# Patient Record
Sex: MALE | Race: OTHER | HISPANIC OR LATINO
[De-identification: names, ages, dates, MRNs, and addresses within clinical notes are randomized per-mention and may not be internally consistent; named-entity substitution may affect disease eponyms.]

---

## 2018-10-15 ENCOUNTER — APPOINTMENT (OUTPATIENT)
Dept: MRI IMAGING | Facility: HOSPITAL | Age: 33
End: 2018-10-15

## 2018-10-18 ENCOUNTER — APPOINTMENT (OUTPATIENT)
Dept: MRI IMAGING | Facility: HOSPITAL | Age: 33
End: 2018-10-18

## 2018-10-18 ENCOUNTER — OUTPATIENT (OUTPATIENT)
Dept: OUTPATIENT SERVICES | Facility: HOSPITAL | Age: 33
LOS: 1 days | End: 2018-10-18
Payer: SUBSIDIZED

## 2018-10-18 DIAGNOSIS — Z00.6 ENCOUNTER FOR EXAMINATION FOR NORMAL COMPARISON AND CONTROL IN CLINICAL RESEARCH PROGRAM: ICD-10-CM

## 2018-10-18 PROCEDURE — 70551 MRI BRAIN STEM W/O DYE: CPT | Mod: 26

## 2018-10-18 PROCEDURE — 70551 MRI BRAIN STEM W/O DYE: CPT

## 2019-03-31 ENCOUNTER — EMERGENCY (EMERGENCY)
Facility: HOSPITAL | Age: 34
LOS: 1 days | Discharge: HOME | End: 2019-03-31
Attending: EMERGENCY MEDICINE | Admitting: EMERGENCY MEDICINE
Payer: COMMERCIAL

## 2019-03-31 VITALS
OXYGEN SATURATION: 99 % | SYSTOLIC BLOOD PRESSURE: 157 MMHG | HEART RATE: 112 BPM | TEMPERATURE: 98 F | RESPIRATION RATE: 16 BRPM | DIASTOLIC BLOOD PRESSURE: 97 MMHG

## 2019-03-31 LAB
ALBUMIN SERPL ELPH-MCNC: 4.2 G/DL — SIGNIFICANT CHANGE UP (ref 3.3–5)
ALP SERPL-CCNC: 76 U/L — SIGNIFICANT CHANGE UP (ref 40–120)
ALT FLD-CCNC: 46 U/L — HIGH (ref 4–41)
ANION GAP SERPL CALC-SCNC: 12 MMO/L — SIGNIFICANT CHANGE UP (ref 7–14)
AST SERPL-CCNC: 31 U/L — SIGNIFICANT CHANGE UP (ref 4–40)
BASOPHILS # BLD AUTO: 0.06 K/UL — SIGNIFICANT CHANGE UP (ref 0–0.2)
BASOPHILS NFR BLD AUTO: 0.5 % — SIGNIFICANT CHANGE UP (ref 0–2)
BILIRUB SERPL-MCNC: < 0.2 MG/DL — LOW (ref 0.2–1.2)
BUN SERPL-MCNC: 13 MG/DL — SIGNIFICANT CHANGE UP (ref 7–23)
CALCIUM SERPL-MCNC: 9.4 MG/DL — SIGNIFICANT CHANGE UP (ref 8.4–10.5)
CHLORIDE SERPL-SCNC: 100 MMOL/L — SIGNIFICANT CHANGE UP (ref 98–107)
CO2 SERPL-SCNC: 26 MMOL/L — SIGNIFICANT CHANGE UP (ref 22–31)
CREAT SERPL-MCNC: 0.57 MG/DL — SIGNIFICANT CHANGE UP (ref 0.5–1.3)
EOSINOPHIL # BLD AUTO: 0.42 K/UL — SIGNIFICANT CHANGE UP (ref 0–0.5)
EOSINOPHIL NFR BLD AUTO: 3.8 % — SIGNIFICANT CHANGE UP (ref 0–6)
GLUCOSE SERPL-MCNC: 98 MG/DL — SIGNIFICANT CHANGE UP (ref 70–99)
GRAM STN SPEC: SIGNIFICANT CHANGE UP
HBA1C BLD-MCNC: 5.3 % — SIGNIFICANT CHANGE UP (ref 4–5.6)
HCT VFR BLD CALC: 49 % — SIGNIFICANT CHANGE UP (ref 39–50)
HGB BLD-MCNC: 16.7 G/DL — SIGNIFICANT CHANGE UP (ref 13–17)
IMM GRANULOCYTES NFR BLD AUTO: 0.4 % — SIGNIFICANT CHANGE UP (ref 0–1.5)
LYMPHOCYTES # BLD AUTO: 2.41 K/UL — SIGNIFICANT CHANGE UP (ref 1–3.3)
LYMPHOCYTES # BLD AUTO: 21.7 % — SIGNIFICANT CHANGE UP (ref 13–44)
MCHC RBC-ENTMCNC: 31.5 PG — SIGNIFICANT CHANGE UP (ref 27–34)
MCHC RBC-ENTMCNC: 34.1 % — SIGNIFICANT CHANGE UP (ref 32–36)
MCV RBC AUTO: 92.3 FL — SIGNIFICANT CHANGE UP (ref 80–100)
MONOCYTES # BLD AUTO: 0.71 K/UL — SIGNIFICANT CHANGE UP (ref 0–0.9)
MONOCYTES NFR BLD AUTO: 6.4 % — SIGNIFICANT CHANGE UP (ref 2–14)
NEUTROPHILS # BLD AUTO: 7.49 K/UL — HIGH (ref 1.8–7.4)
NEUTROPHILS NFR BLD AUTO: 67.2 % — SIGNIFICANT CHANGE UP (ref 43–77)
NRBC # FLD: 0 K/UL — SIGNIFICANT CHANGE UP (ref 0–0)
PLATELET # BLD AUTO: 334 K/UL — SIGNIFICANT CHANGE UP (ref 150–400)
PMV BLD: 9.1 FL — SIGNIFICANT CHANGE UP (ref 7–13)
POTASSIUM SERPL-MCNC: 4.3 MMOL/L — SIGNIFICANT CHANGE UP (ref 3.5–5.3)
POTASSIUM SERPL-SCNC: 4.3 MMOL/L — SIGNIFICANT CHANGE UP (ref 3.5–5.3)
PROT SERPL-MCNC: 7.7 G/DL — SIGNIFICANT CHANGE UP (ref 6–8.3)
RBC # BLD: 5.31 M/UL — SIGNIFICANT CHANGE UP (ref 4.2–5.8)
RBC # FLD: 12.9 % — SIGNIFICANT CHANGE UP (ref 10.3–14.5)
SODIUM SERPL-SCNC: 138 MMOL/L — SIGNIFICANT CHANGE UP (ref 135–145)
SPECIMEN SOURCE: SIGNIFICANT CHANGE UP
WBC # BLD: 11.13 K/UL — HIGH (ref 3.8–10.5)
WBC # FLD AUTO: 11.13 K/UL — HIGH (ref 3.8–10.5)

## 2019-03-31 PROCEDURE — 99218: CPT

## 2019-03-31 PROCEDURE — 73620 X-RAY EXAM OF FOOT: CPT | Mod: 26,LT

## 2019-03-31 RX ORDER — SODIUM CHLORIDE 9 MG/ML
1000 INJECTION INTRAMUSCULAR; INTRAVENOUS; SUBCUTANEOUS ONCE
Qty: 0 | Refills: 0 | Status: COMPLETED | OUTPATIENT
Start: 2019-03-31 | End: 2019-03-31

## 2019-03-31 RX ADMIN — SODIUM CHLORIDE 1000 MILLILITER(S): 9 INJECTION INTRAMUSCULAR; INTRAVENOUS; SUBCUTANEOUS at 19:09

## 2019-03-31 RX ADMIN — Medication 100 MILLIGRAM(S): at 16:38

## 2019-03-31 RX ADMIN — SODIUM CHLORIDE 1000 MILLILITER(S): 9 INJECTION INTRAMUSCULAR; INTRAVENOUS; SUBCUTANEOUS at 20:15

## 2019-03-31 NOTE — ED PROVIDER NOTE - CLINICAL SUMMARY MEDICAL DECISION MAKING FREE TEXT BOX
Patient with left foot cellulitis/abscess, well perfused, no systemic symptoms, worsening on 2 d of augmentin, will check labs, podiatry, abx, reass. Patient with left foot cellulitis/abscess, well perfused, no systemic symptoms, redness, fluctuance worsening on 2 d of augmentin, will check labs, podiatry, abx, reass.

## 2019-03-31 NOTE — ED PROVIDER NOTE - PHYSICAL EXAMINATION
GEN - NAD; well appearing; A+O x3   HEAD - NC/AT   EYES- PERRL, EOMI  ENT: Airway patent, mmm, Oral cavity and pharynx normal. No inflammation, swelling, exudate, or lesions.    NECK: Neck supple, non-tender without lymphadenopathy, no masses.  PULMONARY - CTA b/l, symmetric breath sounds.   CARDIAC -s1s2, RRR, no M,G,R  ABDOMEN - +BS, ND, NT, soft, no guarding, no rebound, no masses   BACK - no CVA tenderness, Normal  spine   EXTREMITIES - FROM, no edema   SKIN - ball of left foot with erythema approx 4cm in diameter, central fluctuance, trace drainage of serosanguinous fluid, no crepitus, 2+ dp pulse, cap refill <2s, remainder of skin without abnormality  NEUROLOGIC - alert, speech clear, no focal deficits  PSYCH -nl mood/affect, nl insight. GEN - NAD; well appearing; A+O x3   HEAD - NC/AT   EYES- PERRL, EOMI  ENT: Airway patent, mmm, Oral cavity and pharynx normal. No inflammation, swelling, exudate, or lesions.    NECK: Neck supple, non-tender without lymphadenopathy, no masses.  PULMONARY - CTA b/l, symmetric breath sounds.   CARDIAC -s1s2, RRR, no M,G,R  ABDOMEN - +BS, ND, NT, soft, no guarding, no rebound, no masses   BACK - no CVA tenderness, Normal  spine   EXTREMITIES - FROM, no edema   SKIN - ball of left foot with erythema approx 4cm in diameter, central fluctuance, trace drainage of serosanguinous fluid, no crepitus, 2+ dp pulse, cap refill <2s, nvi, remainder of skin without abnormality  NEUROLOGIC - alert, speech clear, no focal deficits  PSYCH -nl mood/affect, nl insight.

## 2019-03-31 NOTE — ED PROVIDER NOTE - NS ED ROS FT
ROS:  GENERAL: No fever, no chills  EYES: no change in vision  HEENT: no trouble swallowing, no trouble speaking  CARDIAC: no chest pain  PULMONARY: no cough, no shortness of breath  GI: no abdominal pain, no nausea, no vomiting, no diarrhea, no constipation  : No dysuria, no frequency, no change in appearance, or odor of urine  SKIN: no rashes  NEURO: no headache, no weakness  MSK: +redness, swelling, drainage of fluid from center of ball of foot

## 2019-03-31 NOTE — ED PROVIDER NOTE - OBJECTIVE STATEMENT
34 y/o m presenting with draining wound to left foot. Started 4 days ago, pain and redness to ball of left foot. Saw a podiatrist who did an xray and started him on augmentin. Pain continues, constant, throbbing, now associated with a slow drainage of fluid. No fevers, chills, ha, cp, sob, abd pain, vomiting, diarrhea, dysuria. No weakness or numbness in foot.

## 2019-03-31 NOTE — ED CDU PROVIDER INITIAL DAY NOTE - ATTENDING CONTRIBUTION TO CARE
I performed a face-to-face evaluation of the patient and performed a history and physical examination. I agree with the history and physical examination.    Jigar: Admit to CDU for L foot plantar abscess w/ cellulitis for IV Abx, elevation, and re-assessment.

## 2019-03-31 NOTE — ED CDU PROVIDER INITIAL DAY NOTE - OBJECTIVE STATEMENT
Jigar: 33 M, no relevant PMH, 2 wks ago jumped and landed hard on feet, but didn't have pain after that. 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 Saw a Podiatrist, who prescribed Augmentin. No improvement. Came to [facility] ED. Podiatry I & D, IV Abx. Admit to CDU for continued IV Abx, elevation, and re-assessment.

## 2019-03-31 NOTE — ED PROCEDURE NOTE - PROCEDURE ADDITIONAL DETAILS
US to evaluate what was a likely abscess. Pt with foot pain, fluctuant area with overlying erythema on the plantar surface of the left foot which had opened and begun to spontaneously drain seropurulent fluid. Used linear probe to identify the area which appeared complex and localized as seen on images. Color was placed on the lesion and it was not found to have any flow; surrounding tissue was fairly active. No other findings.

## 2019-03-31 NOTE — PROGRESS NOTE ADULT - ASSESSMENT
34 yo M with left plantar foot abscess.   - Pt seen and evaluated  - Pt afebrile, Tachy to 112, WBC 11.3  - PT block preformed utilizing 12 cc of 1% lidocaine plain. Bedside I&D preformed using 15 blade, 5 cc of puruluence expressed. Wound explored until no further purulence noted. Flushed wound with 50 cc sterile saline. wound was packed and dressed with DSD.   - XR neg   - Rec CDU for IV clinda  - Can DC in the AM if cellulitis/ pain improved  - d/w attending 32 yo M with left plantar foot abscess.   - Pt seen and evaluated  - Pt afebrile, Tachy to 112, WBC 11.3  - PT block preformed utilizing 12 cc of 1% lidocaine plain. Bedside I&D preformed using 15 blade, 5 cc of puruluence expressed. Wound explored until no further purulence noted. Flushed wound with 50 cc sterile saline. wound was packed and dressed with DSD.   - XR neg   - wound culture taken   - Rec CDU for IV clinda  - Can DC in the AM if cellulitis/ pain improved  - d/w attending

## 2019-03-31 NOTE — PROGRESS NOTE ADULT - SUBJECTIVE AND OBJECTIVE BOX
Podiatry pager #: [phone]    Patient is a 33y old  Male who presents with a chief complaint of left foot pain    HPI:  32 y/o m presenting with draining wound to left foot. Started 4 days ago, pain and redness to ball of left foot. Saw a podiatrist who did an xray and started him on augmentin. Pain continues, constant, throbbing, now associated with a slow drainage of fluid. No fevers, chills, ha, cp, sob, abd pain, vomiting, diarrhea, dysuria. No weakness or numbness in foot.    PAST MEDICAL & SURGICAL HISTORY:  Eczema  Seizure: X1 2006  Bipolar disorder  Joint symptoms  No significant past surgical history      MEDICATIONS  (STANDING):    MEDICATIONS  (PRN):      Allergies    all seafood-  swelling, of eyes, throat, mouth (Other)  No Known Drug Allergies    Intolerances        VITALS:    Vital Signs Last 24 Hrs  T(C): 36.8 (31 Mar 2019 12:34), Max: 36.8 (31 Mar 2019 12:34)  T(F): 98.3 (31 Mar 2019 12:34), Max: 98.3 (31 Mar 2019 12:34)  HR: 112 (31 Mar 2019 12:34) (112 - 112)  BP: 157/97 (31 Mar 2019 12:34) (157/97 - 157/97)  BP(mean): --  RR: 16 (31 Mar 2019 12:34) (16 - 16)  SpO2: 99% (31 Mar 2019 12:34) (99% - 99%)    LABS:                          16.7   11.13 )-----------( 334      ( 31 Mar 2019 14:20 )             49.0       03-31    138  |  100  |  13  ----------------------------<  98  4.3   |  26  |  0.57    Ca    9.4      31 Mar 2019 14:20    TPro  7.7  /  Alb  4.2  /  TBili  < 0.2<L>  /  DBili  x   /  AST  31  /  ALT  46<H>  /  AlkPhos  76  03-31      CAPILLARY BLOOD GLUCOSE              LOWER EXTREMITY PHYSICAL EXAM:    Vasular: DP/PT 2 /4, B/L, CFT < 3seconds B/L, Temperature gradient  warm to cool on right foot and warm to warm on left foot.    Neuro: Epicritic sensation intact to the level of toes B/L.  Musculoskeletal/Ortho: no gross deformities   Skin: diffuse annular scaling b/l, LF plantar 3 cm X 3 cm flutuent area with erythema extending 2 cm proximally.       RADIOLOGY & ADDITIONAL STUDIES:    < from: Xray Foot AP + Lateral, Left (03.31.19 @ 15:27) >    ******PRELIMINARY REPORT******    ******PRELIMINARY REPORT******            EXAM:  RAD FOOT 2 VIEWS LEFT        PROCEDURE DATE:  Mar 31 2019     ******PRELIMINARY REPORT******    ******PRELIMINARY REPORT******            INTERPRETATION:  No obvioussoft tissue ulceration or radiographic   evidence for osteomyelitis. No subcutaneous emphysema.            ******PRELIMINARY REPORT******    ******PRELIMINARY REPORT******          GEOFFREY VELOZ M.D., RADIOLOGY RESIDENT              < end of copied text >

## 2019-03-31 NOTE — ED CDU PROVIDER INITIAL DAY NOTE - MEDICAL DECISION MAKING DETAILS
Jigar: Admit to CDU for L foot plantar abscess w/ cellulitis for IV Abx, elevation, and re-assessment.

## 2019-03-31 NOTE — ED CDU PROVIDER INITIAL DAY NOTE - SKIN, MLM
L plantar surface erythema ~4cm diameter, central fluctuance, trace drainage of serosanguinous fluid, no crepitus, 2+ dp pulse, cap refill <2s

## 2019-03-31 NOTE — ED ADULT TRIAGE NOTE - CHIEF COMPLAINT QUOTE
Pt c/o left foot pain.  Pt went to podiatrist for pain and sore on bottom of foot, was told to come to ED if pain continued.  Drainage noted from sore.

## 2019-04-01 LAB
ALBUMIN SERPL ELPH-MCNC: 3.9 G/DL — SIGNIFICANT CHANGE UP (ref 3.3–5)
ALP SERPL-CCNC: 70 U/L — SIGNIFICANT CHANGE UP (ref 40–120)
ALT FLD-CCNC: 44 U/L — HIGH (ref 4–41)
ANION GAP SERPL CALC-SCNC: 12 MMO/L — SIGNIFICANT CHANGE UP (ref 7–14)
AST SERPL-CCNC: 19 U/L — SIGNIFICANT CHANGE UP (ref 4–40)
BASOPHILS # BLD AUTO: 0.03 K/UL — SIGNIFICANT CHANGE UP (ref 0–0.2)
BASOPHILS NFR BLD AUTO: 0.3 % — SIGNIFICANT CHANGE UP (ref 0–2)
BILIRUB SERPL-MCNC: 0.4 MG/DL — SIGNIFICANT CHANGE UP (ref 0.2–1.2)
BUN SERPL-MCNC: 12 MG/DL — SIGNIFICANT CHANGE UP (ref 7–23)
CALCIUM SERPL-MCNC: 9.2 MG/DL — SIGNIFICANT CHANGE UP (ref 8.4–10.5)
CHLORIDE SERPL-SCNC: 102 MMOL/L — SIGNIFICANT CHANGE UP (ref 98–107)
CO2 SERPL-SCNC: 25 MMOL/L — SIGNIFICANT CHANGE UP (ref 22–31)
CREAT SERPL-MCNC: 0.66 MG/DL — SIGNIFICANT CHANGE UP (ref 0.5–1.3)
EOSINOPHIL # BLD AUTO: 0.43 K/UL — SIGNIFICANT CHANGE UP (ref 0–0.5)
EOSINOPHIL NFR BLD AUTO: 4.9 % — SIGNIFICANT CHANGE UP (ref 0–6)
GLUCOSE SERPL-MCNC: 90 MG/DL — SIGNIFICANT CHANGE UP (ref 70–99)
HCT VFR BLD CALC: 45.4 % — SIGNIFICANT CHANGE UP (ref 39–50)
HGB BLD-MCNC: 15.3 G/DL — SIGNIFICANT CHANGE UP (ref 13–17)
IMM GRANULOCYTES NFR BLD AUTO: 0.3 % — SIGNIFICANT CHANGE UP (ref 0–1.5)
LYMPHOCYTES # BLD AUTO: 2.4 K/UL — SIGNIFICANT CHANGE UP (ref 1–3.3)
LYMPHOCYTES # BLD AUTO: 27.6 % — SIGNIFICANT CHANGE UP (ref 13–44)
MCHC RBC-ENTMCNC: 31.4 PG — SIGNIFICANT CHANGE UP (ref 27–34)
MCHC RBC-ENTMCNC: 33.7 % — SIGNIFICANT CHANGE UP (ref 32–36)
MCV RBC AUTO: 93 FL — SIGNIFICANT CHANGE UP (ref 80–100)
MONOCYTES # BLD AUTO: 0.64 K/UL — SIGNIFICANT CHANGE UP (ref 0–0.9)
MONOCYTES NFR BLD AUTO: 7.3 % — SIGNIFICANT CHANGE UP (ref 2–14)
NEUTROPHILS # BLD AUTO: 5.18 K/UL — SIGNIFICANT CHANGE UP (ref 1.8–7.4)
NEUTROPHILS NFR BLD AUTO: 59.6 % — SIGNIFICANT CHANGE UP (ref 43–77)
NRBC # FLD: 0 K/UL — SIGNIFICANT CHANGE UP (ref 0–0)
PLATELET # BLD AUTO: 301 K/UL — SIGNIFICANT CHANGE UP (ref 150–400)
PMV BLD: 9.1 FL — SIGNIFICANT CHANGE UP (ref 7–13)
POTASSIUM SERPL-MCNC: 3.9 MMOL/L — SIGNIFICANT CHANGE UP (ref 3.5–5.3)
POTASSIUM SERPL-SCNC: 3.9 MMOL/L — SIGNIFICANT CHANGE UP (ref 3.5–5.3)
PROT SERPL-MCNC: 6.7 G/DL — SIGNIFICANT CHANGE UP (ref 6–8.3)
RBC # BLD: 4.88 M/UL — SIGNIFICANT CHANGE UP (ref 4.2–5.8)
RBC # FLD: 13.1 % — SIGNIFICANT CHANGE UP (ref 10.3–14.5)
SODIUM SERPL-SCNC: 139 MMOL/L — SIGNIFICANT CHANGE UP (ref 135–145)
WBC # BLD: 8.71 K/UL — SIGNIFICANT CHANGE UP (ref 3.8–10.5)
WBC # FLD AUTO: 8.71 K/UL — SIGNIFICANT CHANGE UP (ref 3.8–10.5)

## 2019-04-01 PROCEDURE — 99217: CPT

## 2019-04-01 RX ADMIN — Medication 100 MILLIGRAM(S): at 09:26

## 2019-04-01 RX ADMIN — Medication 100 MILLIGRAM(S): at 01:21

## 2019-04-01 RX ADMIN — Medication 100 MILLIGRAM(S): at 17:13

## 2019-04-01 RX ADMIN — Medication 600 MILLIGRAM(S): at 10:00

## 2019-04-01 RX ADMIN — Medication 600 MILLIGRAM(S): at 01:55

## 2019-04-01 NOTE — ED CDU PROVIDER SUBSEQUENT DAY NOTE - PROGRESS NOTE DETAILS
Patient resting comfortably, no complaints noted. Pt seen and evaluated by Podiatry, dressing changed with packing removed and wound flushed and re-packed. Sterile dressing placed. Podiatry recommended another 24 hrs of IV abx, wound culture was taken. Podiatry to reassess tomorrow. Pt otherwise stable and aware of plan. Will monitor closely. Patient stable, no complaints at this time. Will continue with current CDU plan and monitor closely.

## 2019-04-01 NOTE — PROGRESS NOTE ADULT - SUBJECTIVE AND OBJECTIVE BOX
Podiatry pager #: [phone]    Patient is a 33y old  Male who presents with a chief complaint of      INTERVAL HPI/OVERNIGHT EVENTS:  Patient seen and evaluated at bedside.  Pt is resting comfortable in NAD. Denies N/V/F/C.    Allergies    all seafood-  swelling, of eyes, throat, mouth (Other)  No Known Drug Allergies    Intolerances        Vital Signs Last 24 Hrs  T(C): 36.3 (01 Apr 2019 05:30), Max: 36.9 (31 Mar 2019 17:11)  T(F): 97.4 (01 Apr 2019 05:30), Max: 98.4 (31 Mar 2019 17:11)  HR: 80 (01 Apr 2019 05:30) (80 - 112)  BP: 105/62 (01 Apr 2019 05:30) (105/62 - 157/97)  BP(mean): --  RR: 16 (01 Apr 2019 05:30) (16 - 19)  SpO2: 98% (01 Apr 2019 05:30) (98% - 100%)    LABS:                        15.3   8.71  )-----------( 301      ( 01 Apr 2019 06:30 )             45.4     04-01    139  |  102  |  12  ----------------------------<  90  3.9   |  25  |  0.66    Ca    9.2      01 Apr 2019 06:30    TPro  6.7  /  Alb  3.9  /  TBili  0.4  /  DBili  x   /  AST  19  /  ALT  44<H>  /  AlkPhos  70  04-01        CAPILLARY BLOOD GLUCOSE          Lower Extremity Physical Exam:  Vasular: DP/PT 2 /4, B/L, CFT < 3 seconds B/L, Temperature gradient warm to cool on right foot and warm to warm on left foot.    Neuro: Epicritic sensation intact to the level of toes B/L.  Musculoskeletal/Ortho: no gross deformities   Skin:, LF plantar wound with tracking dorsally about 1.5cm, granular wound base with scant purulence. Persistent cellulitis around the wound.     RADIOLOGY & ADDITIONAL TESTS:

## 2019-04-01 NOTE — CONSULT NOTE ADULT - SUBJECTIVE AND OBJECTIVE BOX
Yobani Lennon MD  Interventional Cardiology   [place] Office : [address] [place] [address] [place]. [address]  Tel:   [place] Office : [address] [facility] [place] [address]  Tel: [phone]  Cell : [phone]    HISTORY OF PRESENT ILLNESS:    32 y/o m with PMH of Dyslipidemia, active tobacco use, presenting with draining wound to left foot. Started 4 days ago, pain and redness to ball of left foot. Saw a podiatrist who did an xray and started him on augmentin. Pain continues, constant, throbbing, now associated with a slow drainage of fluid. No fevers, chills, ha, cp, abd pain, vomiting, diarrhea, dysuria. No weakness or numbness in foot. when questioned pt does c/o SOB on exertion , for past 3 months not worsening, denies CP     PAST MEDICAL & SURGICAL HISTORY:  Eczema  Seizure: X1 2006  Bipolar disorder  Joint symptoms  No significant past surgical history    	    MEDICATIONS:    clindamycin IVPB 600 milliGRAM(s) IV Intermittent every 8 hours            mupirocin 2% Ointment 1 Application(s) Topical once      FAMILY HISTORY:        Allergies    all seafood-  swelling, of eyes, throat, mouth (Other)  No Known Drug Allergies    Intolerances    	      PHYSICAL EXAM:  T(C): 36.7 (04-02-19 @ 05:13), Max: 36.8 (04-01-19 @ 18:00)  HR: 86 (04-02-19 @ 05:13) (86 - 99)  BP: 100/65 (04-02-19 @ 05:13) (100/65 - 122/76)  RR: 16 (04-02-19 @ 05:13) (16 - 16)  SpO2: 98% (04-02-19 @ 05:13) (97% - 98%)  Wt(kg): --  I&O's Summary      Appearance: Normal	  HEENT:   Normal oral mucosa, PERRL, EOMI	  Cardiovascular: Normal S1 S2, No JVD, No murmurs, No edema  Respiratory: Lungs clear to auscultation	  Gastrointestinal:  Soft, Non-tender, + BS	  Extremities:  No clubbing, cyanosis or edema dressing intact     LABS:	 	    CARDIAC MARKERS:                                  15.3   8.71  )-----------( 301      ( 01 Apr 2019 06:30 )             45.4     04-01    139  |  102  |  12  ----------------------------<  90  3.9   |  25  |  0.66    Ca    9.2      01 Apr 2019 06:30    TPro  6.7  /  Alb  3.9  /  TBili  0.4  /  DBili  x   /  AST  19  /  ALT  44<H>  /  AlkPhos  70  04-01    proBNP:   Lipid Profile:   HgA1c:   TSH:     ASSESSMENT/PLAN:

## 2019-04-01 NOTE — PROGRESS NOTE ADULT - ASSESSMENT
· Assessment		  34 yo M with left plantar foot abscess, s/p bedside I&D  - Pt seen and evaluated  - Pt afebrile, WBC 8.7  - Left plantar foot wound tracked dorsally 1.5cm, granular wound with scant purulence. Persistent pain and cellulitis around the wound. Wound is flushed and repacked.   - XR neg   - wound culture: GPC  - rec admission and continue IV clinda  - will reassess tomorrow  - d/w attending

## 2019-04-01 NOTE — CONSULT NOTE ADULT - ASSESSMENT
EKG: SR     Assessment and Plan     1) SOB: Chronic , not worsening , will get echo as OP, Office information given pt will f/u     2) [employer] use : counseled, regarding danders of smoking      3) LE wound: Distal pulses intact , likely not ischemic , will f/u as OP for ARUN/PVR

## 2019-04-01 NOTE — ED CDU PROVIDER SUBSEQUENT DAY NOTE - HISTORY
CDU Initial Day Note: Jigar: 33 M, no relevant PMH, 2 wks ago jumped and landed hard on feet, but didn't have pain after that. 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 Saw a Podiatrist, who prescribed Augmentin. No improvement. Came to [facility] ED. Podiatry I & D, IV Abx. Admit to CDU for continued IV Abx, elevation, and re-assessment.  CDU Subsequent Day Note: ELAINE Cutler, agree w/ above, patient obs in CDU for IV abs/Pods consult. No acute interval changes.

## 2019-04-02 VITALS
HEART RATE: 86 BPM | RESPIRATION RATE: 16 BRPM | DIASTOLIC BLOOD PRESSURE: 65 MMHG | OXYGEN SATURATION: 98 % | TEMPERATURE: 98 F | SYSTOLIC BLOOD PRESSURE: 100 MMHG

## 2019-04-02 RX ORDER — MUPIROCIN 20 MG/G
1 OINTMENT TOPICAL ONCE
Qty: 0 | Refills: 0 | Status: DISCONTINUED | OUTPATIENT
Start: 2019-04-02 | End: 2019-04-04

## 2019-04-02 RX ADMIN — Medication 100 MILLIGRAM(S): at 01:06

## 2019-04-02 NOTE — PROGRESS NOTE ADULT - ASSESSMENT
32 yo M with left plantar foot abscess, s/p bedside I&D  - Pt seen and evaluated  - Pt afebrile, WBC 8.7  - Left plantar foot granular wound improved with no residual purulence, pain or cellulitis  - wound culture growing gram positive cocci   - no need for packing; recommend daily dressing changes with bactroban + dry sterile dressing   - Pt stable for d/c; d/c on 7 day course of Clinda   - Follow up with Dr. Brooks as outpatient. Call [phone] to schedule appointment.   - d/w attending

## 2019-04-02 NOTE — ED CDU PROVIDER DISPOSITION NOTE - NSFOLLOWUPINSTRUCTIONS_ED_ALL_ED_FT
Patient advised to follow up with PODIATRY 1-2 DAYS AND PRIMARY CARE DOCTOR  and told to return to the emergency department immediately for any new or concerning symptoms such as fevers, chills, worsening pain, swelling, redness OR ANY OTHER COMPLAINTS. Patient agrees with plan.    Take antibiotic as directed   Recommend use of bactroban for daily dressing changes with sterile dressing placed   Keep area, clean, dry and covered   Weight bear as tolerated   Follow up with Dr. Brooks as outpatient. Call [phone]

## 2019-04-02 NOTE — ED CDU PROVIDER DISPOSITION NOTE - CLINICAL COURSE
Jigar: L foot plantar abscess and cellulitis from ill-fitting shoes. No DM. I & D'd by Podiatry. After ~2 days of IV ABx, markedly-improved. Dc on Po Abx w/ Pods f/u.

## 2019-04-02 NOTE — ED CDU PROVIDER DISPOSITION NOTE - PLAN OF CARE
Patient advised to follow up with PODIATRY 1-2 DAYS AND PRIMARY CARE DOCTOR  and told to return to the emergency department immediately for any new or concerning symptoms such as fevers, chills, worsening pain, swelling, redness OR ANY OTHER COMPLAINTS. Patient agrees with plan.    Take antibiotic as directed   Recommend use of bactroban for daily dressing changes with sterile dressing placed   Keep area, clean, dry and covered   Weight bear as tolerated   Follow up with Dr. Brooks as outpatient. Call [phone]

## 2019-04-02 NOTE — PROGRESS NOTE ADULT - SUBJECTIVE AND OBJECTIVE BOX
Patient is a 33y old  Male who presents with a chief complaint of ED (01 Apr 2019 10:02)       INTERVAL HPI/OVERNIGHT EVENTS:  Patient seen and evaluated at bedside.  Pt is resting comfortable in NAD. Denies N/V/F/C.      Allergies    all seafood-  swelling, of eyes, throat, mouth (Other)  No Known Drug Allergies    Intolerances        Vital Signs Last 24 Hrs  T(C): 36.7 (02 Apr 2019 05:13), Max: 36.8 (01 Apr 2019 18:00)  T(F): 98 (02 Apr 2019 05:13), Max: 98.2 (01 Apr 2019 18:00)  HR: 86 (02 Apr 2019 05:13) (86 - 99)  BP: 100/65 (02 Apr 2019 05:13) (100/65 - 122/76)  BP(mean): --  RR: 16 (02 Apr 2019 05:13) (16 - 16)  SpO2: 98% (02 Apr 2019 05:13) (97% - 98%)    LABS:                        15.3   8.71  )-----------( 301      ( 01 Apr 2019 06:30 )             45.4     04-01    139  |  102  |  12  ----------------------------<  90  3.9   |  25  |  0.66    Ca    9.2      01 Apr 2019 06:30    TPro  6.7  /  Alb  3.9  /  TBili  0.4  /  DBili  x   /  AST  19  /  ALT  44<H>  /  AlkPhos  70  04-01        CAPILLARY BLOOD GLUCOSE          Lower Extremity Physical Exam:  Vasular: DP/PT 2 /4, B/L, CFT < 3 seconds B/L, Temperature gradient warm to cool on right foot and warm to warm on left foot.    Neuro: Epicritic sensation intact to the level of toes B/L.  Musculoskeletal/Ortho: no gross deformities   Skin:, LF plantar wound s/p I&D, granular wound base with no residual purulence, cellulitis and pain have resolved. =    RADIOLOGY & ADDITIONAL TESTS:

## 2019-04-02 NOTE — ED CDU PROVIDER SUBSEQUENT DAY NOTE - ENMT, MLM
Airway patent. Nasal mucosa clear. Mouth with normal mucosa. Throat has no vesicles, no oropharyngeal exudates and uvula is midline.
Airway patent. Nasal mucosa clear. Mouth with normal mucosa. Throat has no vesicles, no oropharyngeal exudates and uvula is midline.

## 2019-04-02 NOTE — ED CDU PROVIDER SUBSEQUENT DAY NOTE - CARDIAC, MLM
Normal rate, regular rhythm.  Heart sounds S1, S2.  No murmurs, rubs or gallops.
Normal rate, regular rhythm.  Heart sounds S1, S2.  No murmurs, rubs or gallops.

## 2019-04-02 NOTE — ED CDU PROVIDER SUBSEQUENT DAY NOTE - HISTORY
33 M, no relevant PMH, 2 wks ago jumped and landed hard on feet, but didn't have pain after that. 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 Saw a Podiatrist, who prescribed Augmentin. No improvement. Came to [facility] ED. Podiatry I & D, IV Abx. Admit to CDU for continued IV Abx, elevation, and re-assessment.

## 2019-04-02 NOTE — ED CDU PROVIDER SUBSEQUENT DAY NOTE - PROGRESS NOTE DETAILS
ELAINE Moreno - patient currently resting comfortably - will continue IV abx and podiatry to reeval am. Patient resting comfortably, no complaints at this time. Patient pending re-eval from podiatry. Patient due for another dose of IV abx around 9 am, will monitor and reassess s/p podiatry rec's. Patient re-evaluated this am by podiatry, packing removed and sterile dressing placed. State patient stable for dc home with 7 day course of abx and recommend bactroban and daily dressings and patient to follow up outpatient with Podiatry Dr. Brooks as outpatient. Call [phone]. Pt WBAT. Pt vitals stable, Labs wnl. Pt stable for dc home and outpatient f/u. Wound care instructions given. Pt understood and agreed with plan. All question and concerns addressed. Strict return instructions given. No complaints noted.

## 2019-04-02 NOTE — ED CDU PROVIDER SUBSEQUENT DAY NOTE - VASCULAR COMPROMISE
no vascular compromise/pulses full and equal bilaterally
no vascular compromise/pulses full and equal bilaterally

## 2019-04-02 NOTE — ED CDU PROVIDER SUBSEQUENT DAY NOTE - MEDICAL DECISION MAKING DETAILS
A:  -L foot cellulitis, s/p I&D  P:  -IV Clinda q 8, Podiatry consult
A:  -L foot cellulitis, s/p I&D  P:  -IV Clinda q 8, Podiatry consult

## 2019-04-02 NOTE — ED CDU PROVIDER SUBSEQUENT DAY NOTE - PMH
Bipolar disorder    Eczema    Joint symptoms    Seizure  X1 2006
Bipolar disorder    Eczema    Joint symptoms    Seizure  X1 2006

## 2019-04-02 NOTE — ED CDU PROVIDER SUBSEQUENT DAY NOTE - ATTENDING CONTRIBUTION TO CARE
Villanueva: 33 yom with left foot plantar abscess drained in ED with packing left in place. Today pt feels better, pain in foot in much better, has not tried to ambulate yet. On exam, vitals stable, right plantar surface of foot with wound 2cm with some drainage. Marked area shows mild erythema, no edema, slighly tender, pulse and sensation and cap refill normal. Antibxs, weight bearing as tolerated, podiatry to eval and decide dispo.
I performed a face-to-face evaluation of the patient and performed a history and physical examination. I agree with the history and physical examination.    No complaints overnight (no F, pain is 3-4/10 and only when bears weight). Getting IV Abx. Awaiting Podiatry eval.

## 2019-04-02 NOTE — ED CDU PROVIDER DISPOSITION NOTE - ATTENDING CONTRIBUTION TO CARE
I performed a face-to-face evaluation of the patient and performed a history and physical examination. I agree with the history and physical examination.    Jigar: L foot plantar abscess and cellulitis from ill-fitting shoes. No DM. I & D'd by Podiatry. After ~2 days of IV ABx, markedly-improved. Dc on Po Abx w/ Pods f/u.

## 2019-04-03 LAB
-  CEFAZOLIN: SIGNIFICANT CHANGE UP
-  CIPROFLOXACIN: SIGNIFICANT CHANGE UP
-  CLINDAMYCIN: SIGNIFICANT CHANGE UP
-  ERYTHROMYCIN: SIGNIFICANT CHANGE UP
-  GENTAMICIN: SIGNIFICANT CHANGE UP
-  LEVOFLOXACIN: SIGNIFICANT CHANGE UP
-  LINEZOLID: SIGNIFICANT CHANGE UP
-  MOXIFLOXACIN(AEROBIC): SIGNIFICANT CHANGE UP
-  OXACILLIN: SIGNIFICANT CHANGE UP
-  PENICILLIN: SIGNIFICANT CHANGE UP
-  RIFAMPIN.: SIGNIFICANT CHANGE UP
-  TETRACYCLINE: SIGNIFICANT CHANGE UP
-  TRIMETHOPRIM/SULFAMETHOXAZOLE: SIGNIFICANT CHANGE UP
-  VANCOMYCIN: SIGNIFICANT CHANGE UP
CULTURE RESULTS: SIGNIFICANT CHANGE UP
GRAM STN SPEC: SIGNIFICANT CHANGE UP
METHOD TYPE: SIGNIFICANT CHANGE UP
ORGANISM # SPEC MICROSCOPIC CNT: SIGNIFICANT CHANGE UP
ORGANISM # SPEC MICROSCOPIC CNT: SIGNIFICANT CHANGE UP

## 2019-04-03 NOTE — ED POST DISCHARGE NOTE - RESULT SUMMARY
WCX: MRSA Patient discharged home with a prescription for Clindamycin which is sensitive. Patient contact # [phone] S/W patient discussed with patient MRSA. Patient to follow up with Podiatrist today. Discussed with patient need to return to ED if symptoms don't continue to improve or recur or develops any new or worsening symptoms that are of concern.

## 2019-10-30 NOTE — ED CDU PROVIDER SUBSEQUENT DAY NOTE - NEUROLOGICAL, MLM
Alert and oriented, no focal deficits, no motor or sensory deficits.
Alert and oriented, no focal deficits, no motor or sensory deficits.
none

## 2021-03-17 ENCOUNTER — OUTPATIENT (OUTPATIENT)
Dept: OUTPATIENT SERVICES | Facility: HOSPITAL | Age: 36
LOS: 1 days | Discharge: STILL ADMITTED OR ADMINISTRATIVE | End: 2021-03-17
Payer: COMMERCIAL

## 2021-03-17 PROCEDURE — 90839 PSYTX CRISIS INITIAL 60 MIN: CPT | Mod: 95

## 2021-03-18 DIAGNOSIS — F25.9 SCHIZOAFFECTIVE DISORDER, UNSPECIFIED: ICD-10-CM

## 2021-03-23 PROCEDURE — 90839 PSYTX CRISIS INITIAL 60 MIN: CPT | Mod: 95

## 2021-03-24 PROCEDURE — 90839 PSYTX CRISIS INITIAL 60 MIN: CPT | Mod: 95

## 2021-04-08 PROCEDURE — 99215 OFFICE O/P EST HI 40 MIN: CPT | Mod: 95

## 2021-08-17 ENCOUNTER — EMERGENCY (EMERGENCY)
Facility: HOSPITAL | Age: 36
LOS: 0 days | Discharge: HOME | End: 2021-08-17
Payer: COMMERCIAL

## 2021-08-17 VITALS
SYSTOLIC BLOOD PRESSURE: 139 MMHG | TEMPERATURE: 98 F | OXYGEN SATURATION: 97 % | HEART RATE: 100 BPM | HEIGHT: 70 IN | DIASTOLIC BLOOD PRESSURE: 95 MMHG | RESPIRATION RATE: 18 BRPM | WEIGHT: 240.08 LBS

## 2021-08-17 DIAGNOSIS — L30.9 DERMATITIS, UNSPECIFIED: ICD-10-CM
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
## 2021-08-17 DIAGNOSIS — M54.2 CERVICALGIA: ICD-10-CM

## 2021-08-17 DIAGNOSIS — S16.1XXA STRAIN OF MUSCLE, FASCIA AND TENDON AT NECK LEVEL, INITIAL ENCOUNTER: ICD-10-CM
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
## 2021-08-17 DIAGNOSIS — F31.9 BIPOLAR DISORDER, UNSPECIFIED: ICD-10-CM

## 2021-08-17 PROCEDURE — 99285 EMERGENCY DEPT VISIT HI MDM: CPT

## 2021-08-17 PROCEDURE — 72125 CT NECK SPINE W/O DYE: CPT | Mod: 26

## 2021-08-17 PROCEDURE — 70450 CT HEAD/BRAIN W/O DYE: CPT | Mod: 26

## 2021-08-17 RX ORDER — LIDOCAINE 4 G/100G
1 CREAM TOPICAL ONCE
Refills: 0 | Status: COMPLETED | OUTPATIENT
Start: 2021-08-17 | End: 2021-08-17

## 2021-08-17 RX ORDER — KETOROLAC TROMETHAMINE 30 MG/ML
30 SYRINGE (ML) INJECTION ONCE
Refills: 0 | Status: DISCONTINUED | OUTPATIENT
Start: 2021-08-17 | End: 2021-08-17

## 2021-08-17 RX ADMIN — LIDOCAINE 1 PATCH: 4 CREAM TOPICAL at 14:15

## 2021-08-17 RX ADMIN — Medication 30 MILLIGRAM(S): at 14:15

## 2021-08-17 NOTE — ED PROVIDER NOTE - PHYSICAL EXAMINATION
Physical Exam    Vital Signs: I have reviewed the initial vital signs.  Constitutional: well-nourished, appears stated age, no acute distress  Eyes: PERRLA, EOM intact, RAPD absent, no conjunctival injection, and symmetrical lids.  ENT: Neck supple with no adenopathy, moist MM.  Cardiovascular: regular rate, regular rhythm, well-perfused extremities  Respiratory: unlabored respiratory effort, clear to auscultation bilaterally  Gastrointestinal: soft, non-tender abdomen, no pulsatile mass  Musculoskeletal: supple neck, +TTP over the R trapezius  Integumentary: warm, dry, no rash  Neurologic: awake, alert, cranial nerves II-XII grossly intact, extremities’ motor and sensory functions grossly intact, no ataxia, no dysmetria, steady gait   Psychiatric: A&Ox3, appropriate mood, appropriate affect

## 2021-08-17 NOTE — ED PROVIDER NOTE - NS ED ROS FT
Review of Systems    Constitutional: (-) fever  Eyes/ENT: (-) change in vision, (-) sore throat, (-) ear pain  Cardiovascular: (-) chest pain, (-) palpitation   Respiratory: (-) cough, (-) shortness of breath  Gastrointestinal: (-) abdominal pain (-) vomiting, (-) diarrhea  Musculoskeletal: (-) neck pain, (-) back pain, (-) joint pain  Integumentary: (-) rash, (-) edema  Neurological: (-) altered mental status  Heme/Lymph: (-) easy bruising (-) easy bleeding  Allergic/Immunologic: (-) pruritus

## 2021-08-17 NOTE — ED PROVIDER NOTE - PATIENT PORTAL LINK FT
You can access the FollowMyHealth Patient Portal offered by [facility] by registering at the following website: [email or web address]. By joining [facility]’s FollowMyHealth portal, you will also be able to view your health information using other applications (apps) compatible with our system.

## 2021-08-17 NOTE — ED PROVIDER NOTE - CLINICAL SUMMARY MEDICAL DECISION MAKING FREE TEXT BOX
Pt is a well appearing 34 yo m pw neck pain x2 wk with ttp along the proximal trapezius otherwise neurovascular intact and well appearing, vision changes bc pt dc'ed glasses use 2 wk ago.

## 2021-08-17 NOTE — ED PROVIDER NOTE - CARE PROVIDER_API CALL
Veena Bennett (DO)  Orthopaedic Surgery Surgery  [address] [facility], [address] [place] [address]  Phone: [phone]  Fax: [phone]  Follow Up Time:

## 2021-08-17 NOTE — ED PROVIDER NOTE - OBJECTIVE STATEMENT
36 yo m pmhx sig for bipolar disorder pw 2 wk of r sided proximal trapezius pain aching mod in severity non radiating with no ppt factors, made worse by turning of the neck, pt has no weakness or numbness. Denies neck trauma. Additionally pt reports that blurry vision after he stopped using glasses.    I have reviewed available current nursing and previous documentation of past medical, surgical, family, and/or social history.

## 2021-08-17 NOTE — ED ADULT NURSE NOTE - CHIEF COMPLAINT QUOTE
pt a&O x4, pt c.o of neck pain and headaches x 1 month. R side lower neck pain 8/10. Np fever chills. at home. Pt states unable to sleep at home d/t neck pain. no vision changes or eye pressure. no fever chills. no pmh. pt a&O x4, pt c.o of neck pain and headaches x 1 month. R side lower neck pain 8/10. No fever chills. at home. Pt states unable to sleep at home d/t neck pain. no vision changes or eye pressure. no fever chills. no pmh.

## 2021-08-17 NOTE — ED ADULT NURSE NOTE - OBJECTIVE STATEMENT
Pt received with complaints of pain to back of neck x1 month, voiced blurry vision as well. pt states he wears glasses that was changed last month. Complaints of occasional dizziness

## 2021-08-17 NOTE — ED ADULT TRIAGE NOTE - CHIEF COMPLAINT QUOTE
pt a&O x4, pt c.o of neck pain and headaches x 1 month. R side lower neck pain 8/10. Np fever chills. at home. Pt states unable to sleep at home d/t neck pain. no vision changes or eye pressure. no fever chills. no pmh.

## 2021-08-27 ENCOUNTER — EMERGENCY (EMERGENCY)
Facility: HOSPITAL | Age: 36
LOS: 1 days | Discharge: HOME | End: 2021-08-27
Attending: STUDENT IN AN ORGANIZED HEALTH CARE EDUCATION/TRAINING PROGRAM | Admitting: STUDENT IN AN ORGANIZED HEALTH CARE EDUCATION/TRAINING PROGRAM
Payer: COMMERCIAL

## 2021-08-27 VITALS
TEMPERATURE: 98 F | SYSTOLIC BLOOD PRESSURE: 152 MMHG | DIASTOLIC BLOOD PRESSURE: 111 MMHG | HEIGHT: 70 IN | OXYGEN SATURATION: 100 % | RESPIRATION RATE: 18 BRPM | HEART RATE: 98 BPM

## 2021-08-27 VITALS
DIASTOLIC BLOOD PRESSURE: 102 MMHG | OXYGEN SATURATION: 99 % | RESPIRATION RATE: 18 BRPM | SYSTOLIC BLOOD PRESSURE: 147 MMHG | HEART RATE: 100 BPM

## 2021-08-27 LAB
ALBUMIN SERPL ELPH-MCNC: 4.2 G/DL — SIGNIFICANT CHANGE UP (ref 3.3–5)
ALP SERPL-CCNC: 84 U/L — SIGNIFICANT CHANGE UP (ref 40–120)
ALT FLD-CCNC: 32 U/L — SIGNIFICANT CHANGE UP (ref 4–41)
ANION GAP SERPL CALC-SCNC: 15 MMOL/L — HIGH (ref 7–14)
APAP SERPL-MCNC: <15 UG/ML — SIGNIFICANT CHANGE UP (ref 15–25)
APPEARANCE UR: CLEAR — SIGNIFICANT CHANGE UP
APTT BLD: 32.1 SEC — SIGNIFICANT CHANGE UP (ref 27–36.3)
AST SERPL-CCNC: 19 U/L — SIGNIFICANT CHANGE UP (ref 4–40)
BASE EXCESS BLDV CALC-SCNC: 3.7 MMOL/L — HIGH (ref -2–3)
BASOPHILS # BLD AUTO: 0.04 K/UL — SIGNIFICANT CHANGE UP (ref 0–0.2)
BASOPHILS NFR BLD AUTO: 0.3 % — SIGNIFICANT CHANGE UP (ref 0–2)
BILIRUB SERPL-MCNC: <0.2 MG/DL — SIGNIFICANT CHANGE UP (ref 0.2–1.2)
BILIRUB UR-MCNC: NEGATIVE — SIGNIFICANT CHANGE UP
BUN SERPL-MCNC: 11 MG/DL — SIGNIFICANT CHANGE UP (ref 7–23)
CALCIUM SERPL-MCNC: 9.4 MG/DL — SIGNIFICANT CHANGE UP (ref 8.4–10.5)
CHLORIDE SERPL-SCNC: 98 MMOL/L — SIGNIFICANT CHANGE UP (ref 98–107)
CO2 BLDV-SCNC: 31.2 MMOL/L — HIGH (ref 22–26)
CO2 SERPL-SCNC: 22 MMOL/L — SIGNIFICANT CHANGE UP (ref 22–31)
COLOR SPEC: SIGNIFICANT CHANGE UP
CREAT SERPL-MCNC: 0.69 MG/DL — SIGNIFICANT CHANGE UP (ref 0.5–1.3)
DIFF PNL FLD: NEGATIVE — SIGNIFICANT CHANGE UP
EOSINOPHIL # BLD AUTO: 0.36 K/UL — SIGNIFICANT CHANGE UP (ref 0–0.5)
EOSINOPHIL NFR BLD AUTO: 3.1 % — SIGNIFICANT CHANGE UP (ref 0–6)
ETHANOL SERPL-MCNC: <10 MG/DL — SIGNIFICANT CHANGE UP
GLUCOSE SERPL-MCNC: 93 MG/DL — SIGNIFICANT CHANGE UP (ref 70–99)
GLUCOSE UR QL: NEGATIVE — SIGNIFICANT CHANGE UP
HCO3 BLDV-SCNC: 30 MMOL/L — HIGH (ref 22–29)
HCT VFR BLD CALC: 42.8 % — SIGNIFICANT CHANGE UP (ref 39–50)
HGB BLD-MCNC: 14.9 G/DL — SIGNIFICANT CHANGE UP (ref 13–17)
IANC: 6.33 K/UL — SIGNIFICANT CHANGE UP (ref 1.5–8.5)
IMM GRANULOCYTES NFR BLD AUTO: 0.3 % — SIGNIFICANT CHANGE UP (ref 0–1.5)
INR BLD: 1.05 RATIO — SIGNIFICANT CHANGE UP (ref 0.88–1.16)
KETONES UR-MCNC: NEGATIVE — SIGNIFICANT CHANGE UP
LEUKOCYTE ESTERASE UR-ACNC: NEGATIVE — SIGNIFICANT CHANGE UP
LYMPHOCYTES # BLD AUTO: 3.99 K/UL — HIGH (ref 1–3.3)
LYMPHOCYTES # BLD AUTO: 34.4 % — SIGNIFICANT CHANGE UP (ref 13–44)
MCHC RBC-ENTMCNC: 30.8 PG — SIGNIFICANT CHANGE UP (ref 27–34)
MCHC RBC-ENTMCNC: 34.8 GM/DL — SIGNIFICANT CHANGE UP (ref 32–36)
MCV RBC AUTO: 88.6 FL — SIGNIFICANT CHANGE UP (ref 80–100)
MONOCYTES # BLD AUTO: 0.84 K/UL — SIGNIFICANT CHANGE UP (ref 0–0.9)
MONOCYTES NFR BLD AUTO: 7.2 % — SIGNIFICANT CHANGE UP (ref 2–14)
NEUTROPHILS # BLD AUTO: 6.33 K/UL — SIGNIFICANT CHANGE UP (ref 1.8–7.4)
NEUTROPHILS NFR BLD AUTO: 54.7 % — SIGNIFICANT CHANGE UP (ref 43–77)
NITRITE UR-MCNC: NEGATIVE — SIGNIFICANT CHANGE UP
NRBC # BLD: 0 /100 WBCS — SIGNIFICANT CHANGE UP
NRBC # FLD: 0 K/UL — SIGNIFICANT CHANGE UP
PCO2 BLDV: 49 MMHG — SIGNIFICANT CHANGE UP (ref 42–55)
PCP SPEC-MCNC: SIGNIFICANT CHANGE UP
PH BLDV: 7.39 — SIGNIFICANT CHANGE UP (ref 7.32–7.43)
PH UR: 6.5 — SIGNIFICANT CHANGE UP (ref 5–8)
PLATELET # BLD AUTO: 342 K/UL — SIGNIFICANT CHANGE UP (ref 150–400)
PO2 BLDV: 42 MMHG — SIGNIFICANT CHANGE UP
POTASSIUM SERPL-MCNC: 3.9 MMOL/L — SIGNIFICANT CHANGE UP (ref 3.5–5.3)
POTASSIUM SERPL-SCNC: 3.9 MMOL/L — SIGNIFICANT CHANGE UP (ref 3.5–5.3)
PROT SERPL-MCNC: 7.5 G/DL — SIGNIFICANT CHANGE UP (ref 6–8.3)
PROT UR-MCNC: NEGATIVE — SIGNIFICANT CHANGE UP
PROTHROM AB SERPL-ACNC: 11.9 SEC — SIGNIFICANT CHANGE UP (ref 10.6–13.6)
RBC # BLD: 4.83 M/UL — SIGNIFICANT CHANGE UP (ref 4.2–5.8)
RBC # FLD: 12.6 % — SIGNIFICANT CHANGE UP (ref 10.3–14.5)
SALICYLATES SERPL-MCNC: <5 MG/DL — LOW (ref 15–30)
SAO2 % BLDV: 74.2 % — SIGNIFICANT CHANGE UP
SODIUM SERPL-SCNC: 135 MMOL/L — SIGNIFICANT CHANGE UP (ref 135–145)
SP GR SPEC: 1.04 — SIGNIFICANT CHANGE UP (ref 1–1.05)
TOXICOLOGY SCREEN, DRUGS OF ABUSE, SERUM RESULT: SIGNIFICANT CHANGE UP
TROPONIN T, HIGH SENSITIVITY RESULT: <6 NG/L — SIGNIFICANT CHANGE UP
UROBILINOGEN FLD QL: SIGNIFICANT CHANGE UP
WBC # BLD: 11.6 K/UL — HIGH (ref 3.8–10.5)
WBC # FLD AUTO: 11.6 K/UL — HIGH (ref 3.8–10.5)

## 2021-08-27 PROCEDURE — 99285 EMERGENCY DEPT VISIT HI MDM: CPT

## 2021-08-27 PROCEDURE — 70496 CT ANGIOGRAPHY HEAD: CPT | Mod: 26

## 2021-08-27 PROCEDURE — 71045 X-RAY EXAM CHEST 1 VIEW: CPT | Mod: 26

## 2021-08-27 PROCEDURE — 70498 CT ANGIOGRAPHY NECK: CPT | Mod: 26

## 2021-08-27 RX ORDER — SODIUM CHLORIDE 9 MG/ML
1000 INJECTION INTRAMUSCULAR; INTRAVENOUS; SUBCUTANEOUS ONCE
Refills: 0 | Status: COMPLETED | OUTPATIENT
Start: 2021-08-27 | End: 2021-08-27

## 2021-08-27 RX ADMIN — SODIUM CHLORIDE 1000 MILLILITER(S): 9 INJECTION INTRAMUSCULAR; INTRAVENOUS; SUBCUTANEOUS at 22:14

## 2021-08-27 NOTE — ED PROVIDER NOTE - ATTENDING CONTRIBUTION TO CARE
I (Edmund) agree with above, I performed a history and physical. Counseled dorian medical staff, physician assistant, and/or medical student on medical decision making as documented. Medical decisions and treatment interventions were made in real time during the patient encounter. Additionally and/or with the following exceptions: patient presented as a code stroke for dizziness, vss, neuro intact without focal deficit, CT imaging was negative, labs wnl, eventually cleared by  as well and discharged

## 2021-08-27 NOTE — CONSULT NOTE ADULT - ASSESSMENT
35 year old right-handed man with PMHx remote polysubstance abuse, former tobacco use, seizures (2006), HTN, pre-diabetes, bipolar disorder, schizophrenia (refusing psych meds) presents as a CODE STROKE for blurry vision and left upper extremity numbness beginning 8/27 at 8am. LKN 8/26 unknown time, preMRS 0, NIHSS 1. Pt was not eligible for tPA as he was outside of therapeutic window. CT head was negative for acute bleed/large territory infarct. CT angio head/neck was negative for LVO, therefore pt was not a candidate for thrombectomy.     Impression: Vague and fluctuating neurological symptoms without persistence of focal neurological deficits possibly a manifestation of a primary psychiatric condition, precipitated by insomnia and medication non-compliance    Recommendations:  [] Adult behavioral health consultation  [] Urine tox screen  [] Treat headache with IV Toradol 30mg q8h PRN and Tylenol 650mg q8h PRN or IV Tylenol 1,000mg PRN  [] BP control  [] If symptoms persist, then will consider MRI brain  [] No anti-thrombotics for now  [] Telemetry monitoring    Pt to be seen and discussed with Dr. Cornell, neurology attending.    Elsi Carrillo DO  PGY-3  Neurology Resident

## 2021-08-27 NOTE — ED PROVIDER NOTE - NSFOLLOWUPINSTRUCTIONS_ED_ALL_ED_FT
1. You presented to the emergency department for:  dizziness, numbness, and confusion    2. Your evaluation in the emergency department included a physician evaluation and testing consisting of: lab work, imaging, and ecg. You were also seen by the neurology team and psychiatry team. Your work-up did not reveal any findings indicating the need for admission to the hospital or any emergent interventions at this time.     3. It is recommended that you follow-up with the within 2-4 days as discussed for a repeat evaluation, and potentially further testing and treatment.     If needed, to arrange an appointment with a primary care provider please call: [phone]    4. Please continue taking any regular medications as prescribed.     5. PLEASE RETURN TO THE EMERGENCY DEPARTMENT IMMEDIATELY IF you develop any fevers not responding to over the counter medications, uncontrollable nausea and vomiting, an inability to tolerate eating and drinking, difficulty breathing, chest pain, a severe increase in your symptoms or pain, or any other new symptoms that concern you. 1. You presented to the emergency department for:  dizziness, numbness, and confusion    2. Your evaluation in the emergency department included a physician evaluation and testing consisting of: lab work, imaging, and ecg. You were also seen by the neurology team and psychiatry team. Your work-up did not reveal any findings indicating the need for admission to the hospital or any emergent interventions at this time.     3. It is recommended that you follow-up with the Crisis Center as discussed for a repeat evaluation, and potentially further testing and treatment.     If needed, to arrange an appointment with a primary care provider please call: [phone]    4. Please continue taking any regular medications as prescribed.     5. PLEASE RETURN TO THE EMERGENCY DEPARTMENT IMMEDIATELY IF you develop any fevers not responding to over the counter medications, uncontrollable nausea and vomiting, an inability to tolerate eating and drinking, difficulty breathing, chest pain, a severe increase in your symptoms or pain, or any other new symptoms that concern you.

## 2021-08-27 NOTE — ED PROVIDER NOTE - OBJECTIVE STATEMENT
34 yo M hx of HTN, pre-diabetes, bipolar disorder, presenting for c/o 34 yo M hx of HTN, pre-diabetes, bipolar disorder, schizophrenia, former smoker, presenting as code stroke w/ c/o "confusion" per sister, blurred vision, decreased sensation in left upper extremity and left lower extremity, and dizziness described as room is spinning since 8 am today. Sister presenting with pt states that the pt appears to be "confused" and says that this has been worsening over the course of the past 6 months. His symptoms that seem to be new today are his decreased sensation and dizziness. Of note, pt was on several psychiatric medications for his schizophrenia, but discontinued use of them 6 mo ago. Pt has difficulty contributing to hx, but reports ongoing difficulty sleeping. Sister denies recent illicit drug use. Pt denies weakness in his extremities, HA, fevers, cough, congestion, and N/V. Also denies lightheadedness and CP/SOB.

## 2021-08-27 NOTE — ED ADULT NURSE NOTE - OBJECTIVE STATEMENT
Imani RN: Pt c/o dizziness, blurry vision, confusion and left-sided weakness that started at 8AM today. Pt reported symptoms an hour prior to arrival to ED. Per family member, pt has been complaining of SOB and insomnia but refused to see a doctor. Hx of HTN, bipolar and schizophrenia. Pt has been non-compliant with prescribed medications for 1 year now. Confusion has been on and off, worse when pt does not take his medications. Per family member pt would talk on a tangent but today has been more confused. A&Ox4, ambulatory at baseline. Breathing even and unlabored. NSR on the cardiac monitor. No facial droop or slurred speech noted. Pt has equal strength, motor, and sensation to bilateral upper and lower extremities. No drifts noted to bilateral upper and lower extremities. 20G IV placed on left AC and right hand. Labs drawn and sent. Waiting for CT results. Will endorse to family member.

## 2021-08-27 NOTE — ED PROVIDER NOTE - NS ED ROS FT
Gen: Denies fever.   HEENT: Denies headache. Denies congestion.  CV: Denies chest pain. Denies lightheadedness.  Skin: Denies rash.   Resp: Denies SOB. Denies cough.  GI: Denies abd pain. Denies nausea. Denies vomiting. Denies diarrhea. Denies melena. Denies hematochezia.  Msk: Denies extremity swelling. Denies extremity pain.  : Denies dysuria. Denies hematuria.  Neuro: Denies LOC. +dizziness. +new numbness/tingling. Denies new focal weakness.  Psych: Denies SI

## 2021-08-27 NOTE — ED ADULT NURSE NOTE - NSIMPLEMENTINTERV_GEN_ALL_ED
Implemented All Fall Risk Interventions:  [place] to call system. Call bell, personal items and telephone within reach. Instruct patient to call for assistance. Room bathroom lighting operational. Non-slip footwear when patient is off stretcher. Physically safe environment: no spills, clutter or unnecessary equipment. Stretcher in lowest position, wheels locked, appropriate side rails in place. Provide visual cue, wrist band, yellow gown, etc. Monitor gait and stability. Monitor for mental status changes and reorient to person, place, and time. Review medications for side effects contributing to fall risk. Reinforce activity limits and safety measures with patient and family.

## 2021-08-27 NOTE — ED ADULT TRIAGE NOTE - CHIEF COMPLAINT QUOTE
Pt st " Been dizzy and having left sided arm and leg feels numbness started 8am this am. Also not sleeping for weeks....and feel disoriented for month." Hx bipolar/schizo not compliant with Med x 1 year. Sister with Patient Angie. Denies drug alcohol use

## 2021-08-27 NOTE — CONSULT NOTE ADULT - SUBJECTIVE AND OBJECTIVE BOX
HPI:  35 year old right-handed man with PMHx remote polysubstance abuse, seizures (2006) not on any medications, HTN, pre-diabetes, bipolar disorder, schizophrenia (refusing psych meds) presents as code stroke for blurry vision, confusion, and LUE sensory changes.    LKN 8/26 unknown time, preMRS 0, NIHSS 1.       MEDICATIONS  (STANDING):    MEDICATIONS  (PRN):    PAST MEDICAL & SURGICAL HISTORY:  Joint symptoms  Bipolar disorder  Schizophrenia  Seizure, 10/31/2006  Eczema    No significant past surgical history    FAMILY HISTORY:    Allergies  all seafood-  swelling, of eyes, throat, mouth (Other)      SHx - No smoking, No ETOH, No drug abuse    Review of Systems:  CONSTITUTIONAL: No fevers, chills  HEENT: +blurry vision, no visual loss or double vision.  No hearing loss, sneezing, congestion, runny nose or sore throat.  SKIN:  No rash or itching.  CARDIOVASCULAR:  No chest pain, chest pressure or chest discomfort. No palpitations or edema.  RESPIRATORY:  No shortness of breath, cough or sputum.  GASTROINTESTINAL:  No anorexia, nausea, vomiting or diarrhea. No abdominal pain.  GENITOURINARY:  No dysuria. No increased frequency. No retention. No incontinence.  NEUROLOGICAL:  See HPI  MUSCULOSKELETAL:  No muscle, back pain, joint pain or stiffness.  HEMATOLOGIC:  No anemia, bleeding or bruising.  PSYCHIATRIC: +bipolar and schizophrenia    Vital Signs Last 24 Hrs  T(C): 36.7 (27 Aug 2021 21:03), Max: 36.7 (27 Aug 2021 21:03)  T(F): 98 (27 Aug 2021 21:03), Max: 98 (27 Aug 2021 21:03)  HR: 98 (27 Aug 2021 21:03) (98 - 98)  BP: 152/111 (27 Aug 2021 21:03) (152/111 - 152/111)  BP(mean): --  RR: 18 (27 Aug 2021 21:03) (18 - 18)  SpO2: 100% (27 Aug 2021 21:03) (100% - 100%)    PHYSICAL EXAM:  GENERAL: NAD  HEENT: Normocephalic;  conjunctivae and sclerae clear; moist mucous membranes;   NECK: Supple  CHEST/LUNG: Clear to auscultation bilaterally with good air entry   EXTREMITIES: No cyanosis; no edema; no calf tenderness  SKIN: Warm and dry; no rash             Neurological Exam:  - Mental Status: Alert, oriented to person, place, month, year; speech is fluent with intact naming, repetition, and comprehension; follows commands but uncooperative at times  - Cranial Nerves II-XII:    II:  PERRL 3mm b/l; visual fields are full to confrontation  III, IV, VI:  EOMI, no nystagmus  V:  facial sensation is intact in the V1-V3 distribution bilaterally.  VII:  face is symmetric with normal eye closure and smile  VIII:  hearing is intact to finger rub  IX, X:  uvula is midline and soft palate rises symmetrically  XI:  head turning and shoulder shrug are intact bilaterally  XII:  tongue protrudes in the midline  - Motor:  strength is 5/5 throughout; normal muscle bulk and tone throughout; no pronator drift  - Reflexes:  2+ and symmetric at the biceps, triceps, brachioradialis, knees, and ankles;  plantar reflexes downgoing bilaterally  - Sensory: diminished to light touch LUE, otherwise symmetric   - Coordination:  finger-nose-finger and heel-knee-shin intact without dysmetria; rapid alternating hand movements intact  - Gait: requires 1 person assistance due to dizziness    Other:                Radiology    CT:   MRI  EKG:  tele:  TTE:  EEG:              HPI:  35 year old right-handed man with PMHx remote polysubstance abuse, former tobacco use, seizures (2006), HTN, pre-diabetes, bipolar disorder, schizophrenia (refusing psych meds) presents as a CODE STROKE for blurry vision and left upper extremity numbness. Pt is a poor historian and is accompanied by his sister who reports that patient woke up at 8am on 8/27 with symptoms. He has had confusion for the past 1 month, which has been gradually worsening. Pt told his mother that he was having left arm numbness and blurry vision. They did not come to the ED right away as pt initially refused. He has not taken psych meds (Abilify) for 1 year. Sister states that pt does well up to 6 months after discontinuing meds, after that seems to have a "psychotic break". He has been experiencing insomnia for the past 1 month and has been progressive confused; she does not know whether he slept the night prior to waking up with symptoms. Pt was not answering questions directly or cooperating after multiple attempts to re-direct. Pt was in the ED on 8/17 a couple weeks ago with right shoulder pain, he also said he had blurry vision at the time because he stopped using his glasses, but pt's sister denies that pt ever wears glasses on a regular basis.    Sister states she has not witnessed pt using illicit drugs or other substances. A few months ago, pt was smoking cigarettes (tobacco) and since then has stopped. In 2006, pt had seizures from acute drug intoxication/withdrawal - no further seizure episodes and not on any anti-epileptic medications.    LKN 8/26 unknown time, preMRS 0, NIHSS 1. Pt was not eligible for tPA as he was outside of therapeutic window. CT head was negative for acute bleed/large territory infarct. CT angio head/neck was negative for LVO, therefore pt was not a candidate for thrombectomy.    MEDICATIONS  (STANDING):    MEDICATIONS  (PRN):    PAST MEDICAL & SURGICAL HISTORY:  Joint symptoms  Bipolar disorder  Schizophrenia  Seizure, 10/31/2006  Eczema    No significant past surgical history    FAMILY HISTORY:    Allergies  all seafood-  swelling, of eyes, throat, mouth (Other)      SHx - No smoking, No ETOH, No drug abuse    Review of Systems:  CONSTITUTIONAL: No fevers, chills  HEENT: +blurry vision, no visual loss or double vision.  No hearing loss, sneezing, congestion, runny nose or sore throat.  SKIN:  No rash or itching.  CARDIOVASCULAR:  No chest pain, chest pressure or chest discomfort. No palpitations or edema.  RESPIRATORY:  No shortness of breath, cough or sputum.  GASTROINTESTINAL:  No anorexia, nausea, vomiting or diarrhea. No abdominal pain.  GENITOURINARY:  No dysuria. No increased frequency. No retention. No incontinence.  NEUROLOGICAL:  See HPI  MUSCULOSKELETAL:  No muscle, back pain, joint pain or stiffness.  HEMATOLOGIC:  No anemia, bleeding or bruising.  PSYCHIATRIC: +bipolar and schizophrenia    Vital Signs Last 24 Hrs  T(C): 36.7 (27 Aug 2021 21:03), Max: 36.7 (27 Aug 2021 21:03)  T(F): 98 (27 Aug 2021 21:03), Max: 98 (27 Aug 2021 21:03)  HR: 98 (27 Aug 2021 21:03) (98 - 98)  BP: 152/111 (27 Aug 2021 21:03) (152/111 - 152/111)  BP(mean): --  RR: 18 (27 Aug 2021 21:03) (18 - 18)  SpO2: 100% (27 Aug 2021 21:03) (100% - 100%)    PHYSICAL EXAM:  GENERAL: NAD  HEENT: Normocephalic;  conjunctivae and sclerae clear; moist mucous membranes;   NECK: Supple  CHEST/LUNG: Clear to auscultation bilaterally with good air entry   EXTREMITIES: No cyanosis; no edema; no calf tenderness  SKIN: Warm and dry; no rash             Neurological Exam:  - Mental Status: Alert, oriented to person, place, month, year; speech is fluent, though tangential, intact naming, repetition, and comprehension; follows commands but uncooperative at times  - Cranial Nerves II-XII:    II:  PERRL 3mm b/l; visual fields are full to confrontation  III, IV, VI:  EOMI, no nystagmus  V:  facial sensation is intact in the V1-V3 distribution bilaterally.  VII:  face is symmetric with normal eye closure and smile  VIII:  hearing is intact to finger rub  IX, X:  uvula is midline and soft palate rises symmetrically  XI:  head turning and shoulder shrug are intact bilaterally  XII:  tongue protrudes in the midline  - Motor:  strength is grossly symmetric, antigravity in all extremities, does not give full effort with manual motor exam; normal muscle bulk and tone throughout; no pronator drift  - Reflexes:  2+ and symmetric at the biceps, triceps, brachioradialis, knees, and ankles;  plantar reflexes downgoing bilaterally  - Sensory: diminished to light touch LUE, otherwise symmetric; cannot adequately assess extinction  - Coordination:  finger-nose-finger intact without dysmetria   - Gait: requires 1 person assistance due to dizziness    Other:    08-27    135  |  98  |  11  ----------------------------<  93  3.9   |  22  |  0.69    Ca    9.4      27 Aug 2021 22:03    TPro  7.5  /  Alb  4.2  /  TBili  <0.2  /  DBili  x   /  AST  19  /  ALT  32  /  AlkPhos  84  08-27                          14.9   11.60 )-----------( 342      ( 27 Aug 2021 22:03 )             42.8       Radiology  CT Brain Stroke Protocol (08.27.21 @ 21:42) >  IMPRESSION:    No acute intracranial bleeding.    CT Angio Head w/ IV Cont (08.27.21 @ 21:43) >  IMPRESSION: Limited study.    CTA BRAIN: No severe central flow-limiting stenosis or occlusion.    CTA NECK: Patent cervical vasculature. No flow limiting stenosis or occlusion.

## 2021-08-27 NOTE — ED PROVIDER NOTE - PROGRESS NOTE DETAILS
PGY 1 Kumar Moncada: Work up thus far unremarkable. Concern for psychiatric decompensation. Spoke with , and they will evaluate the pt. PGY 1 Kumar Moncada: pt evaluated by . Awaiting recommendations for disposition. PGY 1 Kumar Moncada: Pt cleared by  for return home. Denies SI/HI. Plan for crisis center f/u. pt and family given return precautions and are in agreement with plan.

## 2021-08-27 NOTE — ED PROVIDER NOTE - CLINICAL SUMMARY MEDICAL DECISION MAKING FREE TEXT BOX
34 yo M hx of HTN, pre-diabetes, bipolar disorder, schizophrenia, former smoker, presenting as code stroke w/ c/o "confusion" per sister, blurred vision, decreased sensation in left upper extremity and left lower extremity, and dizziness described as room is spinning since 8 am today. Sister reporting confusion over last few mo since stopping psychiatric medications. No infectious symptoms or other neuro deficits reported. Exam as above, subjective decrease in sensation in left extremities. Consider CVA, metabolic encephalopathy, electrolyte abnormality, intoxication, psychiatric decompensation, schizophrenia, kathie. Low concern for infectious process. Pt evaluated by neuro. Stroke protocol initiated. Labs, imaging, ecg, will reassess.

## 2021-08-27 NOTE — ED ADULT NURSE NOTE - SPO2 (%)
Interval History: Continuing workup. EEG completed. Additional episode/staring into space overnight, self-resolved. Nosebleed due to dry air in the room, otherwise no complaints.     Review of Systems   Constitutional: Negative for chills.   HENT: Positive for nosebleeds, trouble swallowing and voice change. Negative for congestion, rhinorrhea, sinus pressure, sinus pain, sneezing and sore throat.    Eyes: Negative for discharge and itching.   Respiratory: Negative for cough, choking and shortness of breath.    Cardiovascular: Negative for chest pain, palpitations and leg swelling.   Gastrointestinal: Negative for constipation, diarrhea and nausea.   Neurological: Negative for syncope, facial asymmetry, speech difficulty, light-headedness and headaches.     Objective:     Vital Signs (Most Recent):  Temp: 98 °F (36.7 °C) (11/02/18 0440)  Pulse: 93 (11/02/18 0924)  Resp: 17 (11/02/18 0924)  BP: 118/86 (11/02/18 0924)  SpO2: 96 % (11/02/18 0924) Vital Signs (24h Range):  Temp:  [96.8 °F (36 °C)-99.1 °F (37.3 °C)] 98 °F (36.7 °C)  Pulse:  [] 93  Resp:  [12-18] 17  SpO2:  [92 %-97 %] 96 %  BP: ()/(50-86) 118/86     Weight: 67 kg (147 lb 11.3 oz)  Body mass index is 28.85 kg/m².  No intake or output data in the 24 hours ending 11/02/18 1153   Physical Exam   Constitutional: She is oriented to person, place, and time. She appears well-developed and well-nourished. No distress.   HENT:   Head: Normocephalic.   Mouth/Throat: Oropharynx is clear and moist. No oropharyngeal exudate.   Neck: No JVD present. No thyromegaly present.   Cardiovascular: Normal rate, regular rhythm and normal heart sounds.   Pulmonary/Chest: Effort normal and breath sounds normal.   Abdominal: Soft. Bowel sounds are normal.   Musculoskeletal: Normal range of motion. She exhibits no edema.   Neurological: She is alert and oriented to person, place, and time.   Skin: Skin is warm and dry. No rash noted.       Significant Labs: CBC: No  results for input(s): WBC, HGB, HCT, PLT in the last 48 hours.  CMP: No results for input(s): NA, K, CL, CO2, GLU, BUN, CREATININE, CALCIUM, PROT, ALBUMIN, BILITOT, ALKPHOS, AST, ALT, ANIONGAP, EGFRNONAA in the last 48 hours.    Invalid input(s): ESTGFAFRICA  All pertinent labs within the past 24 hours have been reviewed.    Significant Imaging: I have reviewed all pertinent imaging results/findings within the past 24 hours.   97

## 2021-08-28 DIAGNOSIS — F31.73 BIPOLAR DISORDER, IN PARTIAL REMISSION, MOST RECENT EPISODE MANIC: ICD-10-CM

## 2021-08-28 LAB
AMPHET UR-MCNC: NEGATIVE — SIGNIFICANT CHANGE UP
BARBITURATES UR SCN-MCNC: NEGATIVE — SIGNIFICANT CHANGE UP
BENZODIAZ UR-MCNC: NEGATIVE — SIGNIFICANT CHANGE UP
COCAINE METAB.OTHER UR-MCNC: NEGATIVE — SIGNIFICANT CHANGE UP
CREATININE URINE RESULT, DAU: 76 MG/DL — SIGNIFICANT CHANGE UP
METHADONE UR-MCNC: NEGATIVE — SIGNIFICANT CHANGE UP
OPIATES UR-MCNC: NEGATIVE — SIGNIFICANT CHANGE UP
OXYCODONE UR-MCNC: NEGATIVE — SIGNIFICANT CHANGE UP
PCP UR-MCNC: NEGATIVE — SIGNIFICANT CHANGE UP
SARS-COV-2 RNA SPEC QL NAA+PROBE: SIGNIFICANT CHANGE UP
THC UR QL: NEGATIVE — SIGNIFICANT CHANGE UP

## 2021-08-28 PROCEDURE — 90792 PSYCH DIAG EVAL W/MED SRVCS: CPT

## 2021-08-28 NOTE — ED BEHAVIORAL HEALTH ASSESSMENT NOTE - DESCRIPTION
enjoys walking in his neighborhood, lives with sister, collecting unemployment at this time, used to work in hospitality management at a hotel HTN, pre-diabetes Pt was received as code stroke, seen by neuro with extensive imaging. No acute neurological issues identified, "confusion" thought to be related to chronic psychiatric issues. Pt calm and cooperative in ED with stable vitals. No need for restraints or emergency medication.    ICU Vital Signs Last 24 Hrs  T(C): 36.7 (27 Aug 2021 21:03), Max: 36.7 (27 Aug 2021 21:03)  T(F): 98 (27 Aug 2021 21:03), Max: 98 (27 Aug 2021 21:03)  HR: 100 (27 Aug 2021 22:00) (98 - 100)  BP: 147/102 (27 Aug 2021 22:00) (147/102 - 152/111)  BP(mean): --  ABP: --  ABP(mean): --  RR: 18 (27 Aug 2021 22:00) (16 - 18)  SpO2: 99% (27 Aug 2021 22:00) (97% - 100%)

## 2021-08-28 NOTE — ED BEHAVIORAL HEALTH ASSESSMENT NOTE - OTHER PAST PSYCHIATRIC HISTORY (INCLUDE DETAILS REGARDING ONSET, COURSE OF ILLNESS, INPATIENT/OUTPATIENT TREATMENT)
3 past hospitalizations. last at [facility] in 2015. Discharged from [facility] clinic on 10 day letter, last appointment in August 2020. Previously on abilify maintena.

## 2021-08-28 NOTE — ED BEHAVIORAL HEALTH ASSESSMENT NOTE - HPI (INCLUDE ILLNESS QUALITY, SEVERITY, DURATION, TIMING, CONTEXT, MODIFYING FACTORS, ASSOCIATED SIGNS AND SYMPTOMS)
Mr. Miller is a 36 y/o man, PPHx of bipolar d/o, last in treatment at [facility] one year ago, last hospitalization 6 years ago at [facility] in 2015, no known previous SI/HI, PMHx of HTN, significant past substance use history (marijuana, cocaine, alcohol) with no recent use, no known trauma history who was BIB sister for "disorientation" and medical complaints.    On interview, Mr. Miller is sometimes vague and oddly related, but gives a reliable history and is cordial with this psychiatrist. He reports not being able to sleep for approximately one month and woke up today feeling "disoriented," which he goes on to describe as having issues walking and feeling like his left arm was numb. He also endorses chest pain and was concerned that he needed medical treatment. He notes that his "disorientation" feels better now, but is worried that the doctors have not given him any treatments. He is reassured when told his imaging and neurological exam did not reveal any acute findings, but questions why he is being evaluated by psychiatry. He reports getting 5-6 hours of sleep at night which is "not enough," but denies feeling tired during the day. He otherwise denies recent behavioral changes or psychiatric symptoms, including depression, anxiety, AH, VH, paranoia and IOR. He endorses having a good appetite and continuing to care for himself, including showering and cleaning. He admits to having a history of bipolar disorder, but stopped abilify one year ago at [facility] because he did not think he needed medication anymore. He reports taking "vitamins" to treat his bipolar disorder now. He denies feeling manic and denies that he is in need of psychiatric treatment. He is willing to consider going back to [facility], but feels like he cannot make a decision about starting abilify when he is so tired. He reports that he likes going for walks and talking with his family during the day, but does not endorse any daily behavioral dysfunction. He denies SI/HI and denies all recent substance use.    Collateral obtained from patient's sister Angie at bedside - she confirms he saw Dr. Diaz at [facility], but stopped last year because he felt well. He stopped abilify maintena injections at that time, which he was previously stable on for 6 years, his last hospitalization being in 2015. She reports he did well for 6 months, but then began showing worsening symptoms of his bipolar illness, including weird hand gestures, talking to himself and having a "weird look in his eyes." Over the past month, he has developed problems sleeping and talks to himself more. He has been isolative and appears irritable/sad when he usually likes to interact with family. She denies that he expresses AH/VH and denies all current/past SI/HI. She denies recent drug use and denies that he even takes any vitamins. She said he often resists attempts by the family to seek psychiatric help, but the patient woke up this morning concerned about his "confusion" and requested to be brought to the hospital for evaluation. She advocates for psychiatric treatment but feels safe with him at home at this time.

## 2021-08-28 NOTE — ED BEHAVIORAL HEALTH ASSESSMENT NOTE - SUMMARY
Mr. Miller is a 36 y/o man with a history of bipolar d/o with some vague, mild symptoms of active psychiatric illness including odd affect, reduced sleep for one month and possible internal preoccupations. No laura hallucinations endorsed on exam, no obvious signs of internal preoccupation present, but collateral suggests a smoldering decompensation of the patient's bipolar disorder since stopping medication one year ago. Pt denies all current active symptoms except for sleep disturbance. No active SI/HI noted by patient or patient's sister. Pt expresses a desire to seek outpatient care and possibly restart a medication, declines voluntary hospitalization at this time. Sister is concerned he needs treatment, but does not have acute safety concerns at home.    Plan:  -Treat and release - see safety assessment below. Family and patient advised to call 911 in the event of an emergency  -[facility] crisis clinic referral provided  -No meds at this time, recommended OTC benadryl for sleep

## 2021-08-28 NOTE — ED BEHAVIORAL HEALTH ASSESSMENT NOTE - RISK ASSESSMENT
Low Acute Suicide Risk Mr. Miller is not at elevated imminent risk of harm at this time. Pt denies active SI/HI, has no history of SI/HI, is future oriented regarding his family and help seeking at this time. Pt is not overtly manic and does not exhibit overt symptoms of psychosis. He denies persistent depression/anxiety. He is not appropriate for involuntary hospitalization given these lack of acute symptoms and does not desire hospitalization at this time. Pt has some chronic risk factors including his diagnosis of bipolar disorder and past hospitalizations in addition to current non compliance with limited insight. Pt's protective factors include stable housing, future orientation, supportive family, help seeking behavior, lack of SI/HI.

## 2021-09-02 ENCOUNTER — OUTPATIENT (OUTPATIENT)
Dept: OUTPATIENT SERVICES | Facility: HOSPITAL | Age: 36
LOS: 1 days | Discharge: STILL ADMITTED OR ADMINISTRATIVE | End: 2021-09-02

## 2021-09-02 ENCOUNTER — INPATIENT (INPATIENT)
Facility: HOSPITAL | Age: 36
LOS: 11 days | Discharge: HOME | End: 2021-09-14
Attending: PSYCHIATRY & NEUROLOGY | Admitting: PSYCHIATRY & NEUROLOGY
Payer: COMMERCIAL

## 2021-09-02 VITALS — WEIGHT: 255.07 LBS | HEIGHT: 70 IN

## 2021-09-02 DIAGNOSIS — F33.9 MAJOR DEPRESSIVE DISORDER, RECURRENT, UNSPECIFIED: ICD-10-CM

## 2021-09-02 PROCEDURE — 99222 1ST HOSP IP/OBS MODERATE 55: CPT

## 2021-09-02 RX ORDER — DIPHENHYDRAMINE HCL 50 MG
50 CAPSULE ORAL EVERY 6 HOURS
Refills: 0 | Status: DISCONTINUED | OUTPATIENT
Start: 2021-09-02 | End: 2021-09-14

## 2021-09-02 RX ORDER — HALOPERIDOL DECANOATE 100 MG/ML
5 INJECTION INTRAMUSCULAR EVERY 6 HOURS
Refills: 0 | Status: DISCONTINUED | OUTPATIENT
Start: 2021-09-02 | End: 2021-09-14

## 2021-09-02 RX ORDER — DIPHENHYDRAMINE HCL 50 MG
50 CAPSULE ORAL ONCE
Refills: 0 | Status: DISCONTINUED | OUTPATIENT
Start: 2021-09-02 | End: 2021-09-14

## 2021-09-02 RX ORDER — HALOPERIDOL DECANOATE 100 MG/ML
5 INJECTION INTRAMUSCULAR ONCE
Refills: 0 | Status: DISCONTINUED | OUTPATIENT
Start: 2021-09-02 | End: 2021-09-14

## 2021-09-02 RX ORDER — ARIPIPRAZOLE 15 MG/1
5 TABLET ORAL AT BEDTIME
Refills: 0 | Status: DISCONTINUED | OUTPATIENT
Start: 2021-09-02 | End: 2021-09-03

## 2021-09-02 RX ORDER — LANOLIN ALCOHOL/MO/W.PET/CERES
3 CREAM (GRAM) TOPICAL AT BEDTIME
Refills: 0 | Status: DISCONTINUED | OUTPATIENT
Start: 2021-09-02 | End: 2021-09-14

## 2021-09-02 RX ADMIN — ARIPIPRAZOLE 5 MILLIGRAM(S): 15 TABLET ORAL at 20:58

## 2021-09-02 NOTE — BH INPATIENT PSYCHIATRY ASSESSMENT NOTE - RISK ASSESSMENT
Risk factors: h/o psych admissions, noncompliant with treatment, not receiving treatment, unable to care for self 2/2 psychiatric illness    Protective factors: no current SIIP/HIIP, no h/o SA/SIB, no active substance abuse, good physical health, help seeking behaviors    Overall, pt is at low-moedrate risk of harm and meets criteria for psychiatric admission.

## 2021-09-02 NOTE — BH INPATIENT PSYCHIATRY ASSESSMENT NOTE - HPI (INCLUDE ILLNESS QUALITY, SEVERITY, DURATION, TIMING, CONTEXT, MODIFYING FACTORS, ASSOCIATED SIGNS AND SYMPTOMS)
Per Crisis Clinic note, "36 yo M, single, non-caregiver domiciled with mother, currently unemployed, last worked in [employer] 2020. Pt has history of 4 psychiatric hospitalizations, three of the admissions were at [facility]: 05-05, 07-07 and most recently 5/18/15-6/4/15 where he was treated for psychosis. Pt has hx of of diagnoses of Schizophrenia, Schizoaffective D/O and Bipolar I D/O. Pt has hx being tx'd at [facility] ECT 6/12/15-17 as well as PHP 16-7/16/15. Pt was treated at [facility] AOPD from 7/23/15-1/15/21, undersigned reviewed records in [facility] pt was treated for Bipolar I D/O with psychotic features with Abilify maintena 400mg, decided he felt better and did not continue medication after 10/2020. Pt seen at [facility] 4x in  – 3/17/, 3/23, 3/24, . Was prescribed olanzapine 10mg on  by Dr. Torre but states he did not take it. Pt was seen at [facility] ED 21 but declined admission. Pt has Pt denies history of aggressive or violent behavior, no legal conflicts, no access to firearms.  Pt presents to [facility] seeking admission.  PMH: HBP. Meds: none. Allergies: NKDA, shellfish allergy. Pt reports having an “acute” difficulty with reality, difficulty organizing thoughts, states he is here to get back to “fundamentals and nutrition” which is reference to speaking with a psychiatrist and being on medication. Pt denies AVH but appears to be responding to internal stimuli, reports worrying about having a blackout (collateral states blackout refers to first psychotic episode and admission), increased irritability and feeling on edge, feelings of shame due to his condition. Pt’s appetite has decreased, has not eaten in several days. Collateral w/ sister Jonh: reports family has been encouraging to get back on medication, pt has been reluctant up until today. Reports symptoms have been getting worse in the last month, pt is more disoriented, mother had to shower Pt today, increased self-isolation, appears sadder, unable to maintain a conversation without going off topic. Pt recently verbalized “wanting to be in heaven with  grandparents, which he has never done before. Reports noticing scratches on Pt’s neck but unsure if its intentional. Denies recent ETOH or substance use. Inpatient discharge summary from  reviewed: patient had stopped taking oral Abilify 6 months prior to admission, was on 30mg daily and clonazepam 1mg qHS at the time"     Upon arrival to unit, patient continues to be internally preoccupied. Patient is poor historian, and often lost train of thought/could not explain his thoughts. Patient describes feelings of "hurting inside", and a "lack of economy" as reasons why he wants help. Patient denies substance use, medical issues. Patient denies SI/HI.     Per Crisis Clinic note, "36 yo M, single, non-caregiver domiciled with mother, currently unemployed, last worked in [employer] services 2020. Pt has history of 4 psychiatric hospitalizations, three of the admissions were at [facility]: 05-05, 07-07 and most recently 5/18/15-6/4/15 where he was treated for psychosis. Pt has hx of of diagnoses of Schizophrenia, Schizoaffective D/O and Bipolar I D/O. Pt has hx being tx'd at [facility] ECT 6/12/15-17 as well as PHP 16-7/16/15. Pt was treated at [facility] AOPD from 7/23/15-1/15/21, undersigned reviewed records in [facility] pt was treated for Bipolar I D/O with psychotic features with Abilify maintena 400mg, decided he felt better and did not continue medication after 10/2020. Pt seen at [facility] 4x in  – 3/17/, 3/23, 3/24, . Was prescribed olanzapine 10mg on  by Dr. Torre but states he did not take it. Pt was seen at [facility] ED 21 but declined admission. Pt has Pt denies history of aggressive or violent behavior, no legal conflicts, no access to firearms.  Pt presents to [facility] seeking admission.  PMH: HBP. Meds: none. Allergies: NKDA, shellfish allergy. Pt reports having an “acute” difficulty with reality, difficulty organizing thoughts, states he is here to get back to “fundamentals and nutrition” which is reference to speaking with a psychiatrist and being on medication. Pt denies AVH but appears to be responding to internal stimuli, reports worrying about having a blackout (collateral states blackout refers to first psychotic episode and admission), increased irritability and feeling on edge, feelings of shame due to his condition. Pt’s appetite has decreased, has not eaten in several days. Collateral w/ sister Jonh: reports family has been encouraging to get back on medication, pt has been reluctant up until today. Reports symptoms have been getting worse in the last month, pt is more disoriented, mother had to shower Pt today, increased self-isolation, appears sadder, unable to maintain a conversation without going off topic. Pt recently verbalized “wanting to be in heaven with  grandparents, which he has never done before. Reports noticing scratches on Pt’s neck but unsure if its intentional. Denies recent ETOH or substance use. Inpatient discharge summary from  reviewed: patient had stopped taking oral Abilify 6 months prior to admission, was on 30mg daily and clonazepam 1mg qHS at the time"     Upon arrival to unit, patient continues to be internally preoccupied and is minimally verbal. Patient is poor historian, and often displays a lost train of thought/could not explain his thoughts.  Patient often staring off.  When asked, was not able to say what he was looking at.  Patient describes feelings of "hurting inside", and a "lack of economy" as reasons why he wants help.  Admits to disorganized thoughts.  He denies paranoia or hallucinations.  Patient denies substance use, medical issues. Patient denies SI/HI.

## 2021-09-02 NOTE — BH PATIENT PROFILE - STATED REASON FOR ADMISSION
[facility] Division of Kidney Diseases & Hypertension  INITIAL CONSULT NOTE  [phone]--------------------------------------------------------------------------------    HPI: 69 y/o M with CAD s/p 12-14 stents placed in 1990's, and BABAR x 5 placed in Sept 2017, CHF with EF 20%, DM, HTN, HLD, AF on coumadin, CVA , ESRD on HD (MW) COPD, bipolar disorder, dementia was transferred from [facility] for respiratory distress.  Pt was unable to receive HD yesterday due to dyspnea and hypoxia to high 80's on RA and was sent to ED for further management. Patient's nephrologist is Dr. Adams. Currently patient denies any complaints at this time.       PAST HISTORY  --------------------------------------------------------------------------------  PAST MEDICAL & SURGICAL HISTORY:  CVA (cerebral vascular accident)  COPD (chronic obstructive pulmonary disease)  BPH (benign prostatic hyperplasia)  Bipolar affective disorder  Hypertension  Dyslipidemia  Diabetes mellitus  Coronary artery disease  HLD (hyperlipidemia)  Anemia  COPD (chronic obstructive pulmonary disease)  Cholecystitis  Bipolar 1 disorder  Left heart failure  Reflux esophagitis  Coronary atherosclerosis  History of cardiac catheterization    No significant past surgical history    FAMILY HISTORY:  No pertinent family history in first degree relatives    PAST SOCIAL HISTORY:    ALLERGIES & MEDICATIONS  --------------------------------------------------------------------------------  Allergies    No Known Allergies    Intolerances      Standing Inpatient Medications  amiodarone    Tablet 200 milliGRAM(s) Oral daily  artificial  tears Solution 1 Drop(s) Both EYES four times a day  aspirin  chewable 81 milliGRAM(s) Oral daily  atorvastatin 40 milliGRAM(s) Oral at bedtime  busPIRone 5 milliGRAM(s) Oral two times a day  clopidogrel Tablet 75 milliGRAM(s) Oral daily  dextrose 50% Injectable 12.5 Gram(s) IV Push once  dextrose 50% Injectable 25 Gram(s) IV Push once  dextrose 50% Injectable 25 Gram(s) IV Push once  insulin lispro (HumaLOG) corrective regimen sliding scale   SubCutaneous three times a day before meals  insulin lispro (HumaLOG) corrective regimen sliding scale   SubCutaneous at bedtime  metoprolol tartrate 25 milliGRAM(s) Oral two times a day  multivitamin 1 Tablet(s) Oral daily  pantoprazole    Tablet 40 milliGRAM(s) Oral before breakfast  tamsulosin 0.4 milliGRAM(s) Oral at bedtime    PRN Inpatient Medications  dextrose 40% Gel 15 Gram(s) Oral once PRN  glucagon  Injectable 1 milliGRAM(s) IntraMuscular once PRN      REVIEW OF SYSTEMS  --------------------------------------------------------------------------------  Unable to obtain.     VITALS/PHYSICAL EXAM  --------------------------------------------------------------------------------  T(C): 36.5 (06-19-18 @ 06:49), Max: 37.1 (06-18-18 @ 23:56)  HR: 76 (06-19-18 @ 08:38) (73 - 82)  BP: 127/54 (06-19-18 @ 08:38) (111/48 - 128/50)  RR: 21 (06-19-18 @ 08:38) (18 - 24)  SpO2: 100% (06-19-18 @ 08:38) (99% - 100%)  Wt(kg): --        06-18-18 @ 07:01  -  06-19-18 @ 07:00  --------------------------------------------------------  IN: 0 mL / OUT: 100 mL / NET: -100 mL      Physical Exam:  	Gen: Elderly male, NAD  	HEENT: PERRL, supple neck  	Pulm: Rales present over both lungs.   	CV:  S1S2  	Abd: +BS, soft   	Ext: No B/L Lower ext edema  	Neuro: No focal deficits  	Skin: Warm, without rashes  	Vascular access: Right UE AVF present; thrill and bruit heard.     LABS/STUDIES  --------------------------------------------------------------------------------              8.0    14.29 >-----------<  199      [06-19-18 @ 01:11]              25.7     131  |  89  |  85  ----------------------------<  388      [06-19-18 @ 01:11]  4.7   |  22  |  7.78        Ca     8.6     [06-19-18 @ 01:11]    TPro  6.7  /  Alb  3.1  /  TBili  0.3  /  DBili  x   /  AST  20  /  ALT  31  /  AlkPhos  91  [06-19-18 @ 01:11]    PT/INR: PT 17.9 , INR 1.60       [06-19-18 @ 01:11]  PTT: 34.6       [06-19-18 @ 01:11]      Creatinine Trend:  SCr 7.78 [06-19 @ 01:11] [facility] Division of Kidney Diseases & Hypertension  INITIAL CONSULT NOTE  [phone]--------------------------------------------------------------------------------    HPI: 69 y/o M with CAD s/p 12-14 stents placed in 1990's, and BABAR x 5 placed in Sept 2017, CHF with EF 20%, DM, HTN, HLD, AF on coumadin, CVA , ESRD on HD (MWF) COPD, bipolar disorder, dementia was transferred from [facility] for respiratory distress.  Pt was unable to receive HD yesterday due to dyspnea and hypoxia to high 80's on RA and was sent to ED for further management. Patient's nephrologist is Dr. Adams. Currently patient denies any complaints at this time.     history limited. spoke with daughter niraj    PAST HISTORY  --------------------------------------------------------------------------------  PAST MEDICAL & SURGICAL HISTORY:  CVA (cerebral vascular accident)  COPD (chronic obstructive pulmonary disease)  BPH (benign prostatic hyperplasia)  Bipolar affective disorder  Hypertension  Dyslipidemia  Diabetes mellitus  Coronary artery disease  HLD (hyperlipidemia)  Anemia  COPD (chronic obstructive pulmonary disease)  Cholecystitis  Bipolar 1 disorder  Left heart failure  Reflux esophagitis  Coronary atherosclerosis  History of cardiac catheterization    No significant past surgical history    FAMILY HISTORY:  No pertinent family history in first degree relatives    PAST SOCIAL HISTORY:    ALLERGIES & MEDICATIONS  --------------------------------------------------------------------------------  Allergies    No Known Allergies    Intolerances      Standing Inpatient Medications  amiodarone    Tablet 200 milliGRAM(s) Oral daily  artificial  tears Solution 1 Drop(s) Both EYES four times a day  aspirin  chewable 81 milliGRAM(s) Oral daily  atorvastatin 40 milliGRAM(s) Oral at bedtime  busPIRone 5 milliGRAM(s) Oral two times a day  clopidogrel Tablet 75 milliGRAM(s) Oral daily  dextrose 50% Injectable 12.5 Gram(s) IV Push once  dextrose 50% Injectable 25 Gram(s) IV Push once  dextrose 50% Injectable 25 Gram(s) IV Push once  insulin lispro (HumaLOG) corrective regimen sliding scale   SubCutaneous three times a day before meals  insulin lispro (HumaLOG) corrective regimen sliding scale   SubCutaneous at bedtime  metoprolol tartrate 25 milliGRAM(s) Oral two times a day  multivitamin 1 Tablet(s) Oral daily  pantoprazole    Tablet 40 milliGRAM(s) Oral before breakfast  tamsulosin 0.4 milliGRAM(s) Oral at bedtime    PRN Inpatient Medications  dextrose 40% Gel 15 Gram(s) Oral once PRN  glucagon  Injectable 1 milliGRAM(s) IntraMuscular once PRN      REVIEW OF SYSTEMS  --------------------------------------------------------------------------------  Unable to obtain secondary to dementia    VITALS/PHYSICAL EXAM  --------------------------------------------------------------------------------  T(C): 36.5 (06-19-18 @ 06:49), Max: 37.1 (06-18-18 @ 23:56)  HR: 76 (06-19-18 @ 08:38) (73 - 82)  BP: 127/54 (06-19-18 @ 08:38) (111/48 - 128/50)  RR: 21 (06-19-18 @ 08:38) (18 - 24)  SpO2: 100% (06-19-18 @ 08:38) (99% - 100%)  Wt(kg): --        06-18-18 @ 07:01  -  06-19-18 @ 07:00  --------------------------------------------------------  IN: 0 mL / OUT: 100 mL / NET: -100 mL      Physical Exam:  	Gen: Elderly male, NAD  	HEENT: anicteric  	Pulm: Rales present over both lungs.   	CV:  S1S2 rrr  	Abd: +BS, soft   	Ext: No B/L Lower ext edema  	Neuro: No focal deficits  	Skin: Warm, without rashes  	Vascular access: Right UE AVF present; thrill and bruit heard.     LABS/STUDIES  --------------------------------------------------------------------------------              8.0    14.29 >-----------<  199      [06-19-18 @ 01:11]              25.7     131  |  89  |  85  ----------------------------<  388      [06-19-18 @ 01:11]  4.7   |  22  |  7.78        Ca     8.6     [06-19-18 @ 01:11]    TPro  6.7  /  Alb  3.1  /  TBili  0.3  /  DBili  x   /  AST  20  /  ALT  31  /  AlkPhos  91  [06-19-18 @ 01:11]    PT/INR: PT 17.9 , INR 1.60       [06-19-18 @ 01:11]  PTT: 34.6       [06-19-18 @ 01:11]      Creatinine Trend:  SCr 7.78 [06-19 @ 01:11]  Psychosis

## 2021-09-02 NOTE — BH INPATIENT PSYCHIATRY ASSESSMENT NOTE - CURRENT MEDICATION
MEDICATIONS  (STANDING):    MEDICATIONS  (PRN):   MEDICATIONS  (STANDING):  ARIPiprazole 5 milliGRAM(s) Oral at bedtime    MEDICATIONS  (PRN):  diphenhydrAMINE 50 milliGRAM(s) Oral every 6 hours PRN EPS  diphenhydrAMINE   Injectable 50 milliGRAM(s) IntraMuscular once PRN EPS  haloperidol     Tablet 5 milliGRAM(s) Oral every 6 hours PRN agitation  haloperidol    Injectable 5 milliGRAM(s) IntraMuscular once PRN combative behavior  LORazepam     Tablet 2 milliGRAM(s) Oral every 6 hours PRN anxiety  LORazepam   Injectable 2 milliGRAM(s) IntraMuscular once PRN combative behavior  melatonin. 3 milliGRAM(s) Oral at bedtime PRN Insomnia

## 2021-09-02 NOTE — BH INPATIENT PSYCHIATRY ASSESSMENT NOTE - NSBHATTENDATTEST_PSY_ALL_CORE
contacted. Aarti is out of the office; email has been sent.   I have personally seen, examined and participated in the care of this patient. I have reviewed all pertinent clinical information, including history, physical exam, plan and the Medical/PA/NP Student’s note and agree except as noted.

## 2021-09-02 NOTE — BH INPATIENT PSYCHIATRY ASSESSMENT NOTE - NSBHCHARTREVIEWVS_PSY_A_CORE FT
Vital Signs Last 24 Hrs  T(C): --  T(F): --  HR: --  BP: --  BP(mean): --  RR: --  SpO2: --     Vital Signs Last 24 Hrs  T(C): 37.1 (09-02-21 @ 16:00), Max: 37.1 (09-02-21 @ 16:00)  T(F): 98.8 (09-02-21 @ 16:00), Max: 98.8 (09-02-21 @ 16:00)  HR: --  BP: --  BP(mean): --  RR: 20 (09-02-21 @ 16:00) (20 - 20)  SpO2: --    Orthostatic VS  09-02-21 @ 16:00  Lying BP: --/-- HR: --  Sitting BP: 138/85 HR: 102  Standing BP: 144/84 HR: 103  Site: --  Mode: --

## 2021-09-02 NOTE — BH INPATIENT PSYCHIATRY ASSESSMENT NOTE - CASE SUMMARY
The patient presents for admission from the crisis clinic.  The patient is disorganized, unable to fully participate in the interview.  He denies any paranoia or hallucinations, but appears internally preoccupied, staring off and mumbling to himself at times.  The patient appears dysphoric, but denies any SI.  He is coming in voluntarily for help.  Will restart Abilify with plan for JOHNSON.

## 2021-09-02 NOTE — BH INPATIENT PSYCHIATRY ASSESSMENT NOTE - NSBHASSESSSUMMFT_PSY_ALL_CORE
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 in 2005, 2007, and 5/2015 all for psychosis), no past SA, no NSSIB, hx of ECT for 2 years 2015 to 2017, hx of PHP, in [facility] 7/2015 till 1/15/2021 for schizophrenia / schizoaffective disorder / bipolar disorder, remote hx of alcohol and cannabis (15 years ago), was on Abilify Maintenna 400mg JOHNSON last in 10/2020, who has been off meds for almost a year and out of treatment for most of that time with sporadic visits to [facility] during that time at behest of family but refusing meds. Presented to crisis clinic seeking voluntary admission to [facility]. Clinical presentation today consistent with acute psychosis with severe formal thought disorder and frequently appearing to be responding to internal stimuli. Mood features not prominent today.     Upon initial assessment in unit, patient continues to be disorganized, oddly related, continues to exhibit thought blocking and internal preoccupation.     Plan:  - voluntary admission to [facility]  - Abilify 5mg PO qhs  - Plan for JOHNSON Abilify Maintenna closer to d/c  - PRNs placed  - f/u CBC, CMP, Lipid profile, TSH, urinalysis, utox, A1c, ECG  - f/u vitals for HTN? 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 Presented to crisis clinic seeking voluntary admission to [facility]. Clinical presentation today consistent with acute psychosis with severe formal thought disorder and frequently appearing to be responding to internal stimuli. Mood features not prominent today.     Upon initial assessment in unit, patient continues to be disorganized, oddly related, continues to exhibit thought blocking and internal preoccupation.     Plan:  - voluntary admission to [facility]  - Start Abilify 5mg PO qhs  - Plan for JOHNSON Abilify Maintenna closer to d/c  - PRNs placed  - f/u CBC, CMP, Lipid profile, TSH, urinalysis, utox, A1c, ECG  - f/u vitals for HTN?

## 2021-09-02 NOTE — BH INPATIENT PSYCHIATRY ASSESSMENT NOTE - NSBHMETABOLIC_PSY_ALL_CORE_FT
BMI: BMI (kg/m2): 36.9 (08-27-21 @ 21:58)  HbA1c:   Glucose: POCT Blood Glucose.: 96 mg/dL (08-27-21 @ 21:11)    BP: --  Lipid Panel:  BMI: BMI (kg/m2): 36.6 (09-02-21 @ 15:34)  HbA1c:   Glucose: POCT Blood Glucose.: 96 mg/dL (08-27-21 @ 21:11)    BP: --  Lipid Panel:

## 2021-09-02 NOTE — CHART NOTE - NSCHARTNOTEFT_GEN_A_CORE
Screening Medical Evaluation  Patient Admitted from: [facility] admitting diagnosis: Recurrent major depressive disorder    PAST MEDICAL & SURGICAL HISTORY:  Joint symptoms    Bipolar disorder    Seizure  X1 2006    Eczema    No significant past surgical history          Allergies    all seafood-  swelling, of eyes, throat, mouth (Other)  No Known Drug Allergies    Intolerances        Social History: Denies substance use    FAMILY HISTORY:      MEDICATIONS  (STANDING):  ARIPiprazole 5 milliGRAM(s) Oral at bedtime    MEDICATIONS  (PRN):  diphenhydrAMINE 50 milliGRAM(s) Oral every 6 hours PRN EPS  diphenhydrAMINE   Injectable 50 milliGRAM(s) IntraMuscular once PRN EPS  haloperidol     Tablet 5 milliGRAM(s) Oral every 6 hours PRN agitation  haloperidol    Injectable 5 milliGRAM(s) IntraMuscular once PRN combative behavior  LORazepam     Tablet 2 milliGRAM(s) Oral every 6 hours PRN anxiety  LORazepam   Injectable 2 milliGRAM(s) IntraMuscular once PRN combative behavior  melatonin. 3 milliGRAM(s) Oral at bedtime PRN Insomnia      Vital Signs Last 24 Hrs  T(C): 36.9 (02 Sep 2021 17:32), Max: 37.1 (02 Sep 2021 16:00)  T(F): 98.5 (02 Sep 2021 17:32), Max: 98.8 (02 Sep 2021 16:00)  HR: --  BP: --  BP(mean): --  RR: 20 (02 Sep 2021 16:00) (20 - 20)  SpO2: --  CAPILLARY BLOOD GLUCOSE            PHYSICAL EXAM:  GENERAL: NAD, well-developed  HEAD:  Atraumatic, Normocephalic  EYES: EOMI, PERRLA, conjunctiva and sclera clear  NECK: Supple, No JVD  CHEST/LUNG: Clear to auscultation bilaterally; No wheeze  HEART: Regular rate and rhythm; No murmurs, rubs, or gallops  ABDOMEN: Soft, Nontender, Nondistended; Bowel sounds present  EXTREMITIES:  2+ Peripheral Pulses, No clubbing, cyanosis, or edema  PSYCH: AAOx3  NEUROLOGY: non-focal  SKIN: No rashes or lesions    LABS:            RADIOLOGY & ADDITIONAL TESTS:    Assessment and Plan:  35 year old male with a hx of Schizophrenia presents to [facility] with an admitting diagnosis of Recurrent major depressive disorder. Pt has no medical complaints at this time including fevers, headache, dizziness, changes in vision, chest pain, SOB, abdominal pain, N/V/D/C, dysuria.     1. Recurrent major depressive disorder- follow plan per psychiatric team

## 2021-09-02 NOTE — BH INPATIENT PSYCHIATRY ASSESSMENT NOTE - MSE UNSTRUCTURED FT
Upon assessment today, patient poorly groomed, appears stated age.  Patient has poor eye contact  Speech is of normal volume and normal rate  No psychomotor retardation/agitation  Mood: unable to assess  Affect: flat  Thought Process: Thought blocking,   Thought Content: Poverty of content  Perception: Appears internally preoccupied  Unable to assess orientation  Patient is grossly cognitively not intact, Memory is poor  Insight is poor, judgement is poor  Impulse control intact at this time

## 2021-09-03 DIAGNOSIS — F25.9 SCHIZOAFFECTIVE DISORDER, UNSPECIFIED: ICD-10-CM

## 2021-09-03 LAB
A1C WITH ESTIMATED AVERAGE GLUCOSE RESULT: 5.6 % — SIGNIFICANT CHANGE UP (ref 4–5.6)
ALBUMIN SERPL ELPH-MCNC: 5 G/DL — SIGNIFICANT CHANGE UP (ref 3.3–5)
ALP SERPL-CCNC: 96 U/L — SIGNIFICANT CHANGE UP (ref 40–120)
ALT FLD-CCNC: 36 U/L — SIGNIFICANT CHANGE UP (ref 4–41)
ANION GAP SERPL CALC-SCNC: 17 MMOL/L — HIGH (ref 7–14)
AST SERPL-CCNC: 20 U/L — SIGNIFICANT CHANGE UP (ref 4–40)
BASOPHILS # BLD AUTO: 0.02 K/UL — SIGNIFICANT CHANGE UP (ref 0–0.2)
BASOPHILS NFR BLD AUTO: 0.2 % — SIGNIFICANT CHANGE UP (ref 0–2)
BILIRUB SERPL-MCNC: 0.6 MG/DL — SIGNIFICANT CHANGE UP (ref 0.2–1.2)
BUN SERPL-MCNC: 14 MG/DL — SIGNIFICANT CHANGE UP (ref 7–23)
CALCIUM SERPL-MCNC: 10.1 MG/DL — SIGNIFICANT CHANGE UP (ref 8.4–10.5)
CHLORIDE SERPL-SCNC: 99 MMOL/L — SIGNIFICANT CHANGE UP (ref 98–107)
CHOLEST SERPL-MCNC: 216 MG/DL — HIGH
CO2 SERPL-SCNC: 23 MMOL/L — SIGNIFICANT CHANGE UP (ref 22–31)
COVID-19 SPIKE DOMAIN AB INTERP: NEGATIVE — SIGNIFICANT CHANGE UP
COVID-19 SPIKE DOMAIN ANTIBODY RESULT: 0.4 U/ML — SIGNIFICANT CHANGE UP
CREAT SERPL-MCNC: 0.67 MG/DL — SIGNIFICANT CHANGE UP (ref 0.5–1.3)
EOSINOPHIL # BLD AUTO: 0.04 K/UL — SIGNIFICANT CHANGE UP (ref 0–0.5)
EOSINOPHIL NFR BLD AUTO: 0.4 % — SIGNIFICANT CHANGE UP (ref 0–6)
ESTIMATED AVERAGE GLUCOSE: 114 — SIGNIFICANT CHANGE UP
GLUCOSE SERPL-MCNC: 120 MG/DL — HIGH (ref 70–99)
HCT VFR BLD CALC: 45.7 % — SIGNIFICANT CHANGE UP (ref 39–50)
HDLC SERPL-MCNC: 40 MG/DL — LOW
HGB BLD-MCNC: 16.2 G/DL — SIGNIFICANT CHANGE UP (ref 13–17)
IANC: 8.08 K/UL — SIGNIFICANT CHANGE UP (ref 1.5–8.5)
IMM GRANULOCYTES NFR BLD AUTO: 0.3 % — SIGNIFICANT CHANGE UP (ref 0–1.5)
LIPID PNL WITH DIRECT LDL SERPL: 142 MG/DL — HIGH
LYMPHOCYTES # BLD AUTO: 1.95 K/UL — SIGNIFICANT CHANGE UP (ref 1–3.3)
LYMPHOCYTES # BLD AUTO: 18.2 % — SIGNIFICANT CHANGE UP (ref 13–44)
MCHC RBC-ENTMCNC: 31 PG — SIGNIFICANT CHANGE UP (ref 27–34)
MCHC RBC-ENTMCNC: 35.4 GM/DL — SIGNIFICANT CHANGE UP (ref 32–36)
MCV RBC AUTO: 87.5 FL — SIGNIFICANT CHANGE UP (ref 80–100)
MONOCYTES # BLD AUTO: 0.58 K/UL — SIGNIFICANT CHANGE UP (ref 0–0.9)
MONOCYTES NFR BLD AUTO: 5.4 % — SIGNIFICANT CHANGE UP (ref 2–14)
NEUTROPHILS # BLD AUTO: 8.08 K/UL — HIGH (ref 1.8–7.4)
NEUTROPHILS NFR BLD AUTO: 75.5 % — SIGNIFICANT CHANGE UP (ref 43–77)
NON HDL CHOLESTEROL: 176 MG/DL — HIGH
NRBC # BLD: 0 /100 WBCS — SIGNIFICANT CHANGE UP
NRBC # FLD: 0 K/UL — SIGNIFICANT CHANGE UP
PLATELET # BLD AUTO: 373 K/UL — SIGNIFICANT CHANGE UP (ref 150–400)
POTASSIUM SERPL-MCNC: 3.6 MMOL/L — SIGNIFICANT CHANGE UP (ref 3.5–5.3)
POTASSIUM SERPL-SCNC: 3.6 MMOL/L — SIGNIFICANT CHANGE UP (ref 3.5–5.3)
PROT SERPL-MCNC: 8.3 G/DL — SIGNIFICANT CHANGE UP (ref 6–8.3)
RBC # BLD: 5.22 M/UL — SIGNIFICANT CHANGE UP (ref 4.2–5.8)
RBC # FLD: 12.5 % — SIGNIFICANT CHANGE UP (ref 10.3–14.5)
SARS-COV-2 IGG+IGM SERPL QL IA: 0.4 U/ML — SIGNIFICANT CHANGE UP
SARS-COV-2 IGG+IGM SERPL QL IA: NEGATIVE — SIGNIFICANT CHANGE UP
SODIUM SERPL-SCNC: 139 MMOL/L — SIGNIFICANT CHANGE UP (ref 135–145)
TRIGL SERPL-MCNC: 172 MG/DL — HIGH
TSH SERPL-MCNC: 1.72 UIU/ML — SIGNIFICANT CHANGE UP (ref 0.27–4.2)
WBC # BLD: 10.7 K/UL — HIGH (ref 3.8–10.5)
WBC # FLD AUTO: 10.7 K/UL — HIGH (ref 3.8–10.5)

## 2021-09-03 PROCEDURE — 93010 ELECTROCARDIOGRAM REPORT: CPT

## 2021-09-03 PROCEDURE — 99232 SBSQ HOSP IP/OBS MODERATE 35: CPT

## 2021-09-03 RX ORDER — ARIPIPRAZOLE 15 MG/1
10 TABLET ORAL AT BEDTIME
Refills: 0 | Status: DISCONTINUED | OUTPATIENT
Start: 2021-09-03 | End: 2021-09-05

## 2021-09-03 RX ADMIN — ARIPIPRAZOLE 10 MILLIGRAM(S): 15 TABLET ORAL at 20:59

## 2021-09-03 NOTE — BH INPATIENT PSYCHIATRY PROGRESS NOTE - CURRENT MEDICATION
MEDICATIONS  (STANDING):  ARIPiprazole 10 milliGRAM(s) Oral at bedtime    MEDICATIONS  (PRN):  diphenhydrAMINE 50 milliGRAM(s) Oral every 6 hours PRN EPS  diphenhydrAMINE   Injectable 50 milliGRAM(s) IntraMuscular once PRN EPS  haloperidol     Tablet 5 milliGRAM(s) Oral every 6 hours PRN agitation  haloperidol    Injectable 5 milliGRAM(s) IntraMuscular once PRN combative behavior  LORazepam     Tablet 2 milliGRAM(s) Oral every 6 hours PRN anxiety  LORazepam   Injectable 2 milliGRAM(s) IntraMuscular once PRN combative behavior  melatonin. 3 milliGRAM(s) Oral at bedtime PRN Insomnia

## 2021-09-03 NOTE — PSYCHIATRIC REHAB INITIAL EVALUATION - NSBHALCSUBUSE_PSY_ALL_CORE
As per chart, pt has significant history of substance use (marijuana, cocaine, ETOH) with no recent use.

## 2021-09-03 NOTE — BH SOCIAL WORK INITIAL PSYCHOSOCIAL EVALUATION - OTHER PAST PSYCHIATRIC HISTORY (INCLUDE DETAILS REGARDING ONSET, COURSE OF ILLNESS, INPATIENT/OUTPATIENT TREATMENT)
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 Presented to crisis clinic seeking voluntary admission to [facility]. Clinical presentation today consistent with acute psychosis with severe formal thought disorder and frequently appearing to be responding to internal stimuli. Mood features not prominent today."

## 2021-09-03 NOTE — BH INPATIENT PSYCHIATRY PROGRESS NOTE - MSE UNSTRUCTURED FT
Upon assessment today, patient adequately groomed, appears stated age.  Patient has poor eye contact  Speech is of normal volume and normal rate  No psychomotor retardation/agitation  Mood: "round"  Affect: flat  Thought Process: Thought blocking, oddly related  Thought Content: Poverty of content  Perception: Appears internally preoccupied  Patient is alert and oriented x2-3. (Believes it is August 2021)  Patient is grossly cognitively not intact, Memory is poor  Insight is poor, judgement is fair  Impulse control intact at this time Upon assessment today, patient adequately groomed, appears stated age.  Patient has poor eye contact  Speech is of normal volume and normal rate  No psychomotor retardation/agitation  Mood: "round"  Affect: flat  Thought Process: Thought blocking, oddly related  Thought Content: Poverty of content, loose  Perception: Appears internally preoccupied  Patient is alert and oriented x2-3. (Believes it is August 2021)  Patient is grossly cognitively not intact, Memory is poor  Insight is poor, judgement is fair  Impulse control intact at this time

## 2021-09-03 NOTE — BH INPATIENT PSYCHIATRY PROGRESS NOTE - NSBHCHARTREVIEWVS_PSY_A_CORE FT
Vital Signs Last 24 Hrs  T(C): 36.4 (09-03-21 @ 08:27), Max: 37.1 (09-02-21 @ 16:00)  T(F): 97.5 (09-03-21 @ 08:27), Max: 98.8 (09-02-21 @ 16:00)  HR: --  BP: --  BP(mean): --  RR: 20 (09-02-21 @ 16:00) (20 - 20)  SpO2: --    Orthostatic VS  09-03-21 @ 08:27  Lying BP: --/-- HR: --  Sitting BP: 123/71 HR: 108  Standing BP: --/-- HR: --  Site: --  Mode: --  Orthostatic VS  09-02-21 @ 16:00  Lying BP: --/-- HR: --  Sitting BP: 138/85 HR: 102  Standing BP: 144/84 HR: 103  Site: --  Mode: --

## 2021-09-03 NOTE — PSYCHIATRIC REHAB INITIAL EVALUATION - NSBHALCSUBTREAT_PSY_ALL_CORE
As per chart, pt has history of hospitalizations 6 years ago at [facility] in 2015 and was last in treatment at [facility] one year ago.

## 2021-09-03 NOTE — BH INPATIENT PSYCHIATRY PROGRESS NOTE - NSBHFUPINTERVALHXFT_PSY_A_CORE
Patient chart reviewed, and case discussed with interdisciplinary care team. Patient seen, sitting in chair in dayroom. Patient reports feeling "disoriented", describes his mood as "round" as he was looking at an orange in his hand. Patient later did not notice when orange fell out of his hand. Patient often gives blank stare when asked some questions, and often stares away from interviewer. Patient denies SI/HI, AVH. He reports that he is tolerating Abilify and is willing to take JOHNSON.

## 2021-09-03 NOTE — PSYCHIATRIC REHAB INITIAL EVALUATION - NSBHPRRECOMMEND_PSY_ALL_CORE
Psychiatric rehabilitation staff approached patient to orient him to psychiatric rehabilitation staff and services. Patient reported "I am cleaning my room" but was receptive to short interview with writer. Patient was observed with a reportedly "okay" mood and a blunted affect during initial assessment. Pt was observed with poor eye contact and was oddly related at times. Pt was a poor historian. As per chart, pt was BIB sister to ED due to "disorientation" and medical complaints. As per chart, pt reports not being able to sleep for about 1 month, endorses chest pain, denies behavioral changes. However, as per chart/sister, pt has been engaging in "weird hand gestures," talking to himself, and having a "weird look in his eyes." As per chart, pt has history of bipolar disorder, last in treatment at [facility] 1 year ago, last hospitalization 6 years ago at [facility] in 2015, significant past substance use, and stopped his Abilify 1 year ago because he did not think that he needed medication anymore.

## 2021-09-03 NOTE — BH INPATIENT PSYCHIATRY PROGRESS NOTE - NSBHASSESSSUMMFT_PSY_ALL_CORE
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 Presented to crisis clinic seeking voluntary admission to [facility]. Clinical presentation today consistent with acute psychosis with severe formal thought disorder and frequently appearing to be responding to internal stimuli. Mood features not prominent today. Upon initial assessment in unit, patient continues to be disorganized, oddly related, continues to exhibit thought blocking and internal preoccupation.    Upon assessment today, patient continues to be disorganized, and appears to have formal thought disorder.     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 Presented to crisis clinic seeking voluntary admission to [facility]. Clinical presentation today consistent with acute psychosis with severe formal thought disorder and frequently appearing to be responding to internal stimuli. Mood features not prominent today. Upon initial assessment in unit, patient continues to be disorganized, oddly related, continues to exhibit thought blocking and internal preoccupation.    Upon assessment today, patient continues to be grossly disorganized, internally preoccupied, and appears to have formal thought disorder.     Plan:  Plan to titrate Abilify slowly up to 25mg PO qhs  Plan for Abilify maintenna shot closer to d/c  F/u ECG, utox

## 2021-09-04 PROCEDURE — 99231 SBSQ HOSP IP/OBS SF/LOW 25: CPT

## 2021-09-04 RX ADMIN — Medication 50 MILLIGRAM(S): at 16:30

## 2021-09-04 RX ADMIN — ARIPIPRAZOLE 10 MILLIGRAM(S): 15 TABLET ORAL at 21:40

## 2021-09-04 RX ADMIN — HALOPERIDOL DECANOATE 5 MILLIGRAM(S): 100 INJECTION INTRAMUSCULAR at 16:30

## 2021-09-04 RX ADMIN — Medication 2 MILLIGRAM(S): at 16:30

## 2021-09-04 NOTE — BH INPATIENT PSYCHIATRY PROGRESS NOTE - MSE UNSTRUCTURED FT
Patient is casually dressed, calm, cooperative, unable to engage in clinically meaningful interview, oddly-related, poor EC. +PMR. Speech is soft and slow with latency and monotone. Mood "disoriented" and affect is blunted, minimally reactive. TP with paucity of thought. Denies SI/HI, +AH, + delusions. Cognition grossly intact. I&J poor.

## 2021-09-04 NOTE — BH INPATIENT PSYCHIATRY PROGRESS NOTE - NSBHCHARTREVIEWVS_PSY_A_CORE FT
Vital Signs Last 24 Hrs  T(C): 36.3 (09-04-21 @ 08:19), Max: 37.1 (09-03-21 @ 17:11)  T(F): 97.4 (09-04-21 @ 08:19), Max: 98.7 (09-03-21 @ 17:11)  HR: --  BP: 120/78 (09-04-21 @ 08:19) (120/78 - 120/78)  BP(mean): 105 (09-04-21 @ 08:19) (105 - 105)  RR: --  SpO2: --    Orthostatic VS  09-03-21 @ 08:27  Lying BP: --/-- HR: --  Sitting BP: 123/71 HR: 108  Standing BP: --/-- HR: --  Site: --  Mode: --  Orthostatic VS  09-02-21 @ 16:00  Lying BP: --/-- HR: --  Sitting BP: 138/85 HR: 102  Standing BP: 144/84 HR: 103  Site: --  Mode: --

## 2021-09-04 NOTE — BH INPATIENT PSYCHIATRY PROGRESS NOTE - NSBHFUPINTERVALHXFT_PSY_A_CORE
Patient seen for follow up of psychosis. Case reviewed with comprehensive treatment team. No acute events overnight. Good behavioral control. Good self-care. Patient is compliant with abilify with no reported or observed side effects. Patient is eating and sleeping well. Patient is mostly isolative, states he feels "disoriented" but is unable to further elaborate on symptoms.

## 2021-09-04 NOTE — BH INPATIENT PSYCHIATRY PROGRESS NOTE - NSBHASSESSSUMMFT_PSY_ALL_CORE
patient remains psychotic, good behavioral control, compliant with meds.  will continue abilify titration.

## 2021-09-05 PROCEDURE — 99231 SBSQ HOSP IP/OBS SF/LOW 25: CPT

## 2021-09-05 RX ORDER — ARIPIPRAZOLE 15 MG/1
15 TABLET ORAL AT BEDTIME
Refills: 0 | Status: DISCONTINUED | OUTPATIENT
Start: 2021-09-05 | End: 2021-09-08

## 2021-09-05 RX ADMIN — Medication 3 MILLIGRAM(S): at 20:30

## 2021-09-05 RX ADMIN — ARIPIPRAZOLE 15 MILLIGRAM(S): 15 TABLET ORAL at 20:31

## 2021-09-05 NOTE — BH INPATIENT PSYCHIATRY PROGRESS NOTE - MSE UNSTRUCTURED FT
Patient is casually dressed, calm, cooperative, oddly-related, poor EC. +PMR. Speech is soft and slow with latency and monotone. Mood "disoriented" and affect is blunted, minimally reactive. TP with paucity of thought. Denies SI/HI, +AH, + delusions. Cognition grossly intact. I&J poor.

## 2021-09-05 NOTE — BH INPATIENT PSYCHIATRY PROGRESS NOTE - NSBHCHARTREVIEWVS_PSY_A_CORE FT
Vital Signs Last 24 Hrs  T(C): 36.2 (09-05-21 @ 08:28), Max: 36.5 (09-04-21 @ 19:41)  T(F): 97.2 (09-05-21 @ 08:28), Max: 97.7 (09-04-21 @ 19:41)  HR: --  BP: 125/87 (09-05-21 @ 08:28) (125/87 - 125/87)  BP(mean): 98 (09-05-21 @ 08:28) (98 - 98)  RR: --  SpO2: --

## 2021-09-05 NOTE — BH INPATIENT PSYCHIATRY PROGRESS NOTE - NSBHFUPINTERVALHXFT_PSY_A_CORE
Patient seen for follow up of psychosis with thought disorganization. Case reviewed with comprehensive treatment team. No acute events overnight. Good behavioral control. Good self-care. Patient is compliant with abilify 10mg with no reported or observed side effects. Patient is eating and sleeping well. Patient is mostly isolative, states he feels "disoriented" and "trying to make sense of it.  No new complaints.

## 2021-09-05 NOTE — BH INPATIENT PSYCHIATRY PROGRESS NOTE - CURRENT MEDICATION
MEDICATIONS  (STANDING):  ARIPiprazole 15 milliGRAM(s) Oral at bedtime    MEDICATIONS  (PRN):  diphenhydrAMINE 50 milliGRAM(s) Oral every 6 hours PRN EPS  diphenhydrAMINE   Injectable 50 milliGRAM(s) IntraMuscular once PRN EPS  haloperidol     Tablet 5 milliGRAM(s) Oral every 6 hours PRN agitation  haloperidol    Injectable 5 milliGRAM(s) IntraMuscular once PRN combative behavior  LORazepam     Tablet 2 milliGRAM(s) Oral every 6 hours PRN anxiety  LORazepam   Injectable 2 milliGRAM(s) IntraMuscular once PRN combative behavior  melatonin. 3 milliGRAM(s) Oral at bedtime PRN Insomnia

## 2021-09-06 PROCEDURE — 99231 SBSQ HOSP IP/OBS SF/LOW 25: CPT

## 2021-09-06 RX ADMIN — ARIPIPRAZOLE 15 MILLIGRAM(S): 15 TABLET ORAL at 20:47

## 2021-09-06 RX ADMIN — Medication 3 MILLIGRAM(S): at 20:47

## 2021-09-06 NOTE — BH INPATIENT PSYCHIATRY PROGRESS NOTE - NSBHFUPINTERVALHXFT_PSY_A_CORE
Patient seen for follow up of psychosis with thought disorganization. Case reviewed with comprehensive treatment team. No acute events overnight. Good behavioral control. Good self-care. Patient is compliant with abilify 15mg with no reported or observed side effects. Patient is eating and sleeping well. Patient is less isolative, states he feels less confused and is trying to "stay focused on the present and not think too much about the past."  No new complaints.

## 2021-09-06 NOTE — BH INPATIENT PSYCHIATRY PROGRESS NOTE - MSE UNSTRUCTURED FT
Patient is casually dressed, appears stated age, calm, cooperative, good EC. +PMR. Speech is soft and slow with latency and monotone. Mood "fine" and affect is blunted, minimally reactive. TP with paucity of thought. Denies SI/HI, denies AH, denies delusions. Cognition grossly intact. I&J poor.

## 2021-09-06 NOTE — BH INPATIENT PSYCHIATRY PROGRESS NOTE - NSBHCHARTREVIEWVS_PSY_A_CORE FT
Vital Signs Last 24 Hrs  T(C): 36.4 (09-06-21 @ 08:26), Max: 36.4 (09-06-21 @ 08:26)  T(F): 97.5 (09-06-21 @ 08:26), Max: 97.5 (09-06-21 @ 08:26)  HR: --  BP: 136/94 (09-06-21 @ 08:26) (136/94 - 136/94)  BP(mean): 104 (09-06-21 @ 08:26) (104 - 104)  RR: --  SpO2: --

## 2021-09-07 PROCEDURE — 99232 SBSQ HOSP IP/OBS MODERATE 35: CPT

## 2021-09-07 RX ADMIN — ARIPIPRAZOLE 15 MILLIGRAM(S): 15 TABLET ORAL at 22:00

## 2021-09-07 NOTE — BH INPATIENT PSYCHIATRY PROGRESS NOTE - NSBHCHARTREVIEWVS_PSY_A_CORE FT
Vital Signs Last 24 Hrs  T(C): 36.3 (09-07-21 @ 08:02), Max: 36.7 (09-06-21 @ 16:57)  T(F): 97.3 (09-07-21 @ 08:02), Max: 98.1 (09-06-21 @ 16:57)  HR: 95 (09-07-21 @ 08:02) (95 - 95)  BP: 131/86 (09-07-21 @ 08:02) (131/86 - 131/86)  BP(mean): --  RR: --  SpO2: --     Vital Signs Last 24 Hrs  T(C): 37 (09-07-21 @ 17:17), Max: 37 (09-07-21 @ 17:17)  T(F): 98.6 (09-07-21 @ 17:17), Max: 98.6 (09-07-21 @ 17:17)  HR: 95 (09-07-21 @ 08:02) (95 - 95)  BP: 131/86 (09-07-21 @ 08:02) (131/86 - 131/86)  BP(mean): --  RR: --  SpO2: --

## 2021-09-07 NOTE — BH INPATIENT PSYCHIATRY PROGRESS NOTE - MSE UNSTRUCTURED FT
Upon assessment today, patient adequately groomed, appears stated age.  Patient has poor eye contact  Speech is of normal volume and normal rate  No psychomotor retardation/agitation  Mood: "moderate"  Affect: flat  Thought Process: Oddly related  Thought Content: Poverty of content, loose  Perception: Appears internally preoccupied  Patient is alert and oriented x3  Patient is grossly cognitively not intact, Memory is poor  Insight is poor, judgement is fair  Impulse control intact at this time

## 2021-09-07 NOTE — BH INPATIENT PSYCHIATRY PROGRESS NOTE - NSBHFUPINTERVALHXFT_PSY_A_CORE
Patient chart reviewed, and case discussed with interdisciplinary care team. Patient seen, sitting in chair in bedroom. Patient reports that the weekend was okay, but says his appetite has decreased. When patient asked how he is today, he responds "I want to get back to the fundamentals and speak with the ".   Describes his mood as "moderate". When asked if he has any visual hallucinations, patient states that he "sees colors because of nutrition" Patient denies SI/HI, AH. He reports that he is tolerating Abilify and is willing to take JOHNSON.

## 2021-09-07 NOTE — BH INPATIENT PSYCHIATRY PROGRESS NOTE - NSBHASSESSSUMMFT_PSY_ALL_CORE
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 Presented to crisis clinic seeking voluntary admission to [facility]. Clinical presentation today consistent with acute psychosis with severe formal thought disorder and frequently appearing to be responding to internal stimuli. Mood features not prominent today. Upon initial assessment in unit, patient continues to be disorganized, oddly related, continues to exhibit thought blocking and internal preoccupation.    Upon assessment today, patient continues to be grossly disorganized, internally preoccupied, but has made some improvements in his ability to participate in thoughtful interview.     Plan:  Plan to titrate Abilify slowly up to 25mg PO qhs  Plan for Abilify maintenna shot closer to d/c  F/u ECG

## 2021-09-08 PROCEDURE — 99232 SBSQ HOSP IP/OBS MODERATE 35: CPT

## 2021-09-08 PROCEDURE — 93010 ELECTROCARDIOGRAM REPORT: CPT

## 2021-09-08 RX ORDER — ARIPIPRAZOLE 15 MG/1
20 TABLET ORAL DAILY
Refills: 0 | Status: DISCONTINUED | OUTPATIENT
Start: 2021-09-08 | End: 2021-09-08

## 2021-09-08 RX ORDER — ARIPIPRAZOLE 15 MG/1
20 TABLET ORAL AT BEDTIME
Refills: 0 | Status: DISCONTINUED | OUTPATIENT
Start: 2021-09-08 | End: 2021-09-09

## 2021-09-08 RX ADMIN — ARIPIPRAZOLE 20 MILLIGRAM(S): 15 TABLET ORAL at 20:57

## 2021-09-08 NOTE — BH INPATIENT PSYCHIATRY PROGRESS NOTE - CURRENT MEDICATION
MEDICATIONS  (STANDING):  ARIPiprazole 20 milliGRAM(s) Oral at bedtime    MEDICATIONS  (PRN):  diphenhydrAMINE 50 milliGRAM(s) Oral every 6 hours PRN EPS  diphenhydrAMINE   Injectable 50 milliGRAM(s) IntraMuscular once PRN EPS  haloperidol     Tablet 5 milliGRAM(s) Oral every 6 hours PRN agitation  haloperidol    Injectable 5 milliGRAM(s) IntraMuscular once PRN combative behavior  LORazepam     Tablet 2 milliGRAM(s) Oral every 6 hours PRN anxiety  LORazepam   Injectable 2 milliGRAM(s) IntraMuscular once PRN combative behavior  melatonin. 3 milliGRAM(s) Oral at bedtime PRN Insomnia

## 2021-09-08 NOTE — BH INPATIENT PSYCHIATRY PROGRESS NOTE - NSBHASSESSSUMMFT_PSY_ALL_CORE
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 Presented to crisis clinic seeking voluntary admission to [facility]. Clinical presentation today consistent with acute psychosis with severe formal thought disorder and frequently appearing to be responding to internal stimuli. Mood features not prominent today. Upon initial assessment in unit, patient continues to be disorganized, oddly related, continues to exhibit thought blocking and internal preoccupation.    Upon assessment today, patient continues to be grossly disorganized, internally preoccupied, but has made some improvements in his ability to participate in thoughtful interview.     Plan:  Plan to titrate Abilify slowly up to 25mg PO qhs  Plan for Abilify maintenna shot closer to d/c  F/u ECG

## 2021-09-08 NOTE — BH INPATIENT PSYCHIATRY PROGRESS NOTE - NSBHFUPINTERVALHXFT_PSY_A_CORE
Patient chart reviewed, and case discussed with interdisciplinary care team. Patient seen, sitting in chair in bedroom. No acute events overnight. Patient describes his appetite as "moderate" and states that he got a good amount of sleep last night.

## 2021-09-08 NOTE — BH INPATIENT PSYCHIATRY PROGRESS NOTE - NSBHCHARTREVIEWVS_PSY_A_CORE FT
Vital Signs Last 24 Hrs  T(C): 36.9 (09-08-21 @ 07:53), Max: 37 (09-07-21 @ 17:17)  T(F): 98.5 (09-08-21 @ 07:53), Max: 98.6 (09-07-21 @ 17:17)  HR: 87 (09-08-21 @ 07:53) (87 - 87)  BP: 123/83 (09-08-21 @ 07:53) (123/83 - 123/83)  BP(mean): --  RR: --  SpO2: --     Vital Signs Last 24 Hrs  T(C): 36.9 (09-08-21 @ 07:53), Max: 36.9 (09-08-21 @ 07:53)  T(F): 98.5 (09-08-21 @ 07:53), Max: 98.5 (09-08-21 @ 07:53)  HR: 87 (09-08-21 @ 07:53) (87 - 87)  BP: 123/83 (09-08-21 @ 07:53) (123/83 - 123/83)  BP(mean): --  RR: --  SpO2: --

## 2021-09-09 PROCEDURE — 99231 SBSQ HOSP IP/OBS SF/LOW 25: CPT

## 2021-09-09 RX ORDER — ARIPIPRAZOLE 15 MG/1
25 TABLET ORAL AT BEDTIME
Refills: 0 | Status: DISCONTINUED | OUTPATIENT
Start: 2021-09-09 | End: 2021-09-10

## 2021-09-09 RX ADMIN — ARIPIPRAZOLE 25 MILLIGRAM(S): 15 TABLET ORAL at 20:50

## 2021-09-09 NOTE — BH TREATMENT PLAN - NSTXDCOPLKINTERRN_PSY_ALL_CORE
Discuss the discharge planning process with patient and family. Ensure patient understands the discharge process and identifies strategies to help prevent relapse. Assist patient to work on strategies for appropriate outpatient services.

## 2021-09-09 NOTE — BH TREATMENT PLAN - NSDCCRITERIA_PSY_ALL_CORE
Improvement in disorganization, psychotic symptoms
Improvement in disorganization, psychotic symptoms

## 2021-09-09 NOTE — BH INPATIENT PSYCHIATRY PROGRESS NOTE - NSBHFUPINTERVALHXFT_PSY_A_CORE
Patient chart reviewed, and case discussed with interdisciplinary care team. Patient seen, sitting in chair in bedroom. No acute events overnight. Patient says that he has been feeling nervous, but is unable to explain why. He repeatedly states that he wants to see his . Patient describes his mood as "obscure and unstable". He continues to state that he "hope to get back to fundamentals", and says that he "didn't do arithmetic in a while". Patient states that he no longer feels disorganized. He states that he has a good appetite, and slept well. He denies SI/HI, AVH.

## 2021-09-09 NOTE — BH INPATIENT PSYCHIATRY PROGRESS NOTE - NSBHCHARTREVIEWVS_PSY_A_CORE FT
Vital Signs Last 24 Hrs  T(C): 36.3 (09-09-21 @ 09:09), Max: 36.3 (09-09-21 @ 09:09)  T(F): 97.4 (09-09-21 @ 09:09), Max: 97.4 (09-09-21 @ 09:09)  HR: --  BP: --  BP(mean): --  RR: --  SpO2: --    Orthostatic VS  09-09-21 @ 09:09  Lying BP: --/-- HR: --  Sitting BP: 130/84 HR: 92  Standing BP: 128/88 HR: 82  Site: --  Mode: --

## 2021-09-09 NOTE — BH TREATMENT PLAN - NSTXPATIENTPARTICIPATE_PSY_ALL_CORE
Patient participated in identification of needs/problems/goals for treatment/Patient participated in defining interventions
Patient participated in identification of needs/problems/goals for treatment/Patient participated in defining interventions

## 2021-09-09 NOTE — BH TREATMENT PLAN - NSTXPSYCHOGOAL_PSY_ALL_CORE
Will identify 2 coping skills that assist with focus on reality
Will identify 2 coping skills that assist with focus on reality

## 2021-09-09 NOTE — BH TREATMENT PLAN - NSTXPSYCHOINTERRN_PSY_ALL_CORE
Assess patient for A/V hallucinations and other signs of psychosis.  Provide reality orientation/redirection as necessary.
Assess patient regularly for active A/V hallucinations, delusional thinking and other signs of psychosis. Provide reality orientation as needed.

## 2021-09-09 NOTE — BH INPATIENT PSYCHIATRY PROGRESS NOTE - CURRENT MEDICATION
MEDICATIONS  (STANDING):  ARIPiprazole 20 milliGRAM(s) Oral at bedtime    MEDICATIONS  (PRN):  diphenhydrAMINE 50 milliGRAM(s) Oral every 6 hours PRN EPS  diphenhydrAMINE   Injectable 50 milliGRAM(s) IntraMuscular once PRN EPS  haloperidol     Tablet 5 milliGRAM(s) Oral every 6 hours PRN agitation  haloperidol    Injectable 5 milliGRAM(s) IntraMuscular once PRN combative behavior  LORazepam     Tablet 2 milliGRAM(s) Oral every 6 hours PRN anxiety  LORazepam   Injectable 2 milliGRAM(s) IntraMuscular once PRN combative behavior  melatonin. 3 milliGRAM(s) Oral at bedtime PRN Insomnia   MEDICATIONS  (STANDING):  ARIPiprazole 25 milliGRAM(s) Oral at bedtime    MEDICATIONS  (PRN):  diphenhydrAMINE 50 milliGRAM(s) Oral every 6 hours PRN EPS  diphenhydrAMINE   Injectable 50 milliGRAM(s) IntraMuscular once PRN EPS  haloperidol     Tablet 5 milliGRAM(s) Oral every 6 hours PRN agitation  haloperidol    Injectable 5 milliGRAM(s) IntraMuscular once PRN combative behavior  LORazepam     Tablet 2 milliGRAM(s) Oral every 6 hours PRN anxiety  LORazepam   Injectable 2 milliGRAM(s) IntraMuscular once PRN combative behavior  melatonin. 3 milliGRAM(s) Oral at bedtime PRN Insomnia

## 2021-09-09 NOTE — BH TREATMENT PLAN - NSTXDISORGINTERRN_PSY_ALL_CORE
Assess patient for A/V hallucinations. Assess for mood and behavior. Administer medication as prescribed and monitor for effects. Encourage pt to verbalize thoughts/feelings/concerns to staff. Provide support and other signs of psychosis. Provide emotional support.

## 2021-09-09 NOTE — BH INPATIENT PSYCHIATRY PROGRESS NOTE - MSE UNSTRUCTURED FT
Upon assessment today, patient adequately groomed, appears stated age.  Patient has poor eye contact  Speech is of normal volume and normal rate  No psychomotor retardation/agitation  Mood: "moderate"  Affect: flat  Thought Process: Oddly related  Thought Content: Poverty of content, loose  Perception: Appears internally preoccupied  Patient is alert and oriented x3  Patient is grossly cognitively not intact, Memory is poor  Insight is poor, judgement is fair  Impulse control intact at this time     Upon assessment today, patient adequately groomed, appears stated age.  Patient has poor eye contact  Speech is of normal volume and normal rate  No psychomotor retardation/agitation  Mood: "obscure"  Affect: flat  Thought Process: Oddly related  Thought Content: Poverty of content, loose  Perception: Appears internally preoccupied  Patient is alert and oriented x3  Patient is grossly cognitively not intact, Memory is poor  Insight is poor, judgement is fair  Impulse control intact at this time

## 2021-09-09 NOTE — BH TREATMENT PLAN - NSTXMEDICINTERPR_PSY_ALL_CORE
Psychiatric Rehabilitation staff will continuously engage patient to build on a therapeutic rapport and to assist in achieving the above stated goal.
Psychiatric Rehabilitation staff will continuously engage patient to build on a therapeutic rapport and to assist in achieving the above stated goal.

## 2021-09-09 NOTE — BH INPATIENT PSYCHIATRY PROGRESS NOTE - NSBHASSESSSUMMFT_PSY_ALL_CORE
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 Presented to crisis clinic seeking voluntary admission to [facility]. Clinical presentation today consistent with acute psychosis with severe formal thought disorder and frequently appearing to be responding to internal stimuli. Mood features not prominent today. Upon initial assessment in unit, patient continues to be disorganized, oddly related, continues to exhibit thought blocking and internal preoccupation.    Upon assessment today, patient continues to be grossly disorganized, internally preoccupied, but has made some improvements in his ability to participate in thoughtful interview.     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, PPH of 4 past psych admissions (3 at [facility] in 2005, 2007, and 5/2015 all for psychosis), no past SA, no NSSIB, hx of ECT for 2 years 2015 to 2017, hx of PHP, in [facility] 7/2015 till 1/15/2021 for schizophrenia / schizoaffective disorder / bipolar disorder, remote hx of alcohol and cannabis (15 years ago), was on Abilify Maintenna 400mg JOHNSON last in 10/2020, who has been off meds for almost a year and out of treatment for most of that time with sporadic visits to [facility] during that time at behest of family but refusing meds. Presented to crisis clinic seeking voluntary admission to [facility]. Clinical presentation today consistent with acute psychosis with severe formal thought disorder and frequently appearing to be responding to internal stimuli. Mood features not prominent today. Upon initial assessment in unit, patient continues to be disorganized, oddly related, continues to exhibit thought blocking and internal preoccupation.    Upon assessment today, patient continues to be grossly disorganized, internally preoccupied, but has made some improvements in his ability to participate in thoughtful interview.     Plan:  Titrating Abilify slowly up to 25mg PO qhs  Plan for Abilify maintenna shot closer to d/c  F/u ECG

## 2021-09-09 NOTE — BH TREATMENT PLAN - NSTXDCOPLKINTERSW_PSY_ALL_CORE
Writer will work to educate and encourage the patient to accept recommended aftercare.
Writer will work to educate and encourage the patient to accept recommended aftercare.

## 2021-09-09 NOTE — BH TREATMENT PLAN - NSTXMEDICINTERRN_PSY_ALL_CORE
Assess patient's understanding of reasons for relapse. Assist patient in identifying early signs of relapse to communicate to care providers.  Reinforce the importance of compliance with medication, treatment and use of resources in the community.  Provide patient education regarding the nature of their mental illness and the importance of participating in their illness management.

## 2021-09-09 NOTE — BH TREATMENT PLAN - NSTXDISORGGOAL_PSY_ALL_CORE
Will identify 2 coping skills that assist in organizing
Will identify 2 coping skills that assist in organizing

## 2021-09-10 PROCEDURE — 99232 SBSQ HOSP IP/OBS MODERATE 35: CPT

## 2021-09-10 RX ORDER — ARIPIPRAZOLE 15 MG/1
30 TABLET ORAL AT BEDTIME
Refills: 0 | Status: DISCONTINUED | OUTPATIENT
Start: 2021-09-10 | End: 2021-09-14

## 2021-09-10 RX ADMIN — ARIPIPRAZOLE 30 MILLIGRAM(S): 15 TABLET ORAL at 20:08

## 2021-09-10 NOTE — BH INPATIENT PSYCHIATRY PROGRESS NOTE - CURRENT MEDICATION
MEDICATIONS  (STANDING):  ARIPiprazole 30 milliGRAM(s) Oral at bedtime    MEDICATIONS  (PRN):  diphenhydrAMINE 50 milliGRAM(s) Oral every 6 hours PRN EPS  diphenhydrAMINE   Injectable 50 milliGRAM(s) IntraMuscular once PRN EPS  haloperidol     Tablet 5 milliGRAM(s) Oral every 6 hours PRN agitation  haloperidol    Injectable 5 milliGRAM(s) IntraMuscular once PRN combative behavior  LORazepam     Tablet 2 milliGRAM(s) Oral every 6 hours PRN anxiety  LORazepam   Injectable 2 milliGRAM(s) IntraMuscular once PRN combative behavior  melatonin. 3 milliGRAM(s) Oral at bedtime PRN Insomnia

## 2021-09-10 NOTE — BH INPATIENT PSYCHIATRY PROGRESS NOTE - MSE UNSTRUCTURED FT
Upon assessment today, patient adequately groomed, appears stated age.  Patient has poor eye contact  Speech is of normal volume and normal rate  No psychomotor retardation/agitation  Mood: "a little anxious"  Affect: euthymic  Thought Process: Oddly related  Thought Content: Poverty of content, loose  Perception: Occasionally appears internally preoccupied  Patient is alert and oriented x3  Patient is grossly cognitively not intact, Memory is poor  Insight is poor, judgement is fair  Impulse control intact at this time

## 2021-09-10 NOTE — BH INPATIENT PSYCHIATRY PROGRESS NOTE - NSBHFUPINTERVALHXFT_PSY_A_CORE
Patient chart reviewed, and case discussed with interdisciplinary care team. Patient seen in outside room. No acute events overnight. Patient says that he is doing well, and feels "a little anxious", saying that he is not sure if he can socialize, and wants to wait for  to get approval to socialize on the unit. He also says that he wants to meet with  to create a plan for when he is discharged. Patient was encouraged to socialize, and was informed that he will be set up with outpatient appointment upon d/c. Patient inconsistent with answer to whether he feels disoriented or not (said no initially, but says that he is disoriented towards end of interview). Patient continues to express "nutrition", "fundamentals", and "being able to focus" as his goals of treatment. Patient states that he is tolerating Abilify, and is agreeable to increase to 30mg. Patient denies SI/HI, AVH.

## 2021-09-10 NOTE — BH INPATIENT PSYCHIATRY PROGRESS NOTE - NSBHASSESSSUMMFT_PSY_ALL_CORE
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 Presented to crisis clinic seeking voluntary admission to [facility]. Clinical presentation today consistent with acute psychosis with severe formal thought disorder and frequently appearing to be responding to internal stimuli. Mood features not prominent today. Upon initial assessment in unit, patient continues to be disorganized, oddly related, continues to exhibit thought blocking and internal preoccupation.    Upon assessment today, patient continues to be oddly related, has made some improvements in his ability to participate in thoughtful interview, possibly less internally preoccupied.     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, PPH of 4 past psych admissions (3 at [facility] in 2005, 2007, and 5/2015 all for psychosis), no past SA, no NSSIB, hx of ECT for 2 years 2015 to 2017, hx of PHP, in [facility] 7/2015 till 1/15/2021 for schizophrenia / schizoaffective disorder / bipolar disorder, remote hx of alcohol and cannabis (15 years ago), was on Abilify Maintenna 400mg JOHNSON last in 10/2020, who has been off meds for almost a year and out of treatment for most of that time with sporadic visits to [facility] during that time at behest of family but refusing meds. Presented to crisis clinic seeking voluntary admission to [facility]. Clinical presentation today consistent with acute psychosis with severe formal thought disorder and frequently appearing to be responding to internal stimuli. Mood features not prominent today. Upon initial assessment in unit, patient continues to be disorganized, oddly related, continues to exhibit thought blocking and internal preoccupation.    Upon assessment today, patient continues to be oddly related, has made some improvements in his ability to participate in thoughtful interview, possibly less internally preoccupied.     Plan:  Titrating Abilify slowly up to 30mg PO qhs  Plan for Abilify Maintena JOHNSON closer to d/c

## 2021-09-10 NOTE — BH INPATIENT PSYCHIATRY PROGRESS NOTE - NSBHCHARTREVIEWVS_PSY_A_CORE FT
Vital Signs Last 24 Hrs  T(C): 36.7 (09-10-21 @ 08:41), Max: 36.7 (09-10-21 @ 08:41)  T(F): 98 (09-10-21 @ 08:41), Max: 98 (09-10-21 @ 08:41)  HR: 89 (09-10-21 @ 08:41) (89 - 89)  BP: 137/92 (09-10-21 @ 08:41) (137/92 - 137/92)  BP(mean): --  RR: --  SpO2: --    Orthostatic VS  09-09-21 @ 09:09  Lying BP: --/-- HR: --  Sitting BP: 130/84 HR: 92  Standing BP: 128/88 HR: 82  Site: --  Mode: --

## 2021-09-11 RX ADMIN — ARIPIPRAZOLE 30 MILLIGRAM(S): 15 TABLET ORAL at 20:27

## 2021-09-11 RX ADMIN — Medication 3 MILLIGRAM(S): at 20:27

## 2021-09-12 RX ADMIN — ARIPIPRAZOLE 30 MILLIGRAM(S): 15 TABLET ORAL at 20:01

## 2021-09-13 RX ORDER — ARIPIPRAZOLE 15 MG/1
400 TABLET ORAL ONCE
Refills: 0 | Status: COMPLETED | OUTPATIENT
Start: 2021-09-13 | End: 2021-09-13

## 2021-09-13 RX ORDER — ARIPIPRAZOLE 15 MG/1
1 TABLET ORAL
Qty: 14 | Refills: 0
Start: 2021-09-13 | End: 2021-09-26

## 2021-09-13 RX ORDER — ARIPIPRAZOLE 15 MG/1
400 TABLET ORAL
Qty: 1 | Refills: 0
Start: 2021-09-13 | End: 2021-09-13

## 2021-09-13 RX ADMIN — ARIPIPRAZOLE 400 MILLIGRAM(S): 15 TABLET ORAL at 18:10

## 2021-09-13 RX ADMIN — Medication 3 MILLIGRAM(S): at 21:00

## 2021-09-13 NOTE — BH INPATIENT PSYCHIATRY DISCHARGE NOTE - NSDCCPCAREPLAN_GEN_ALL_CORE_FT
PRINCIPAL DISCHARGE DIAGNOSIS  Diagnosis: Schizophrenia  Assessment and Plan of Treatment:        PRINCIPAL DISCHARGE DIAGNOSIS  Diagnosis: Schizoaffective disorder  Assessment and Plan of Treatment: Medication management and psychotherapy

## 2021-09-13 NOTE — BH INPATIENT PSYCHIATRY PROGRESS NOTE - NSBHCHARTREVIEWVS_PSY_A_CORE FT
Vital Signs Last 24 Hrs  T(C): 36.4 (09-13-21 @ 07:38), Max: 36.5 (09-12-21 @ 16:14)  T(F): 97.6 (09-13-21 @ 07:38), Max: 97.7 (09-12-21 @ 16:14)  HR: --  BP: --  BP(mean): --  RR: --  SpO2: --    Orthostatic VS  09-13-21 @ 07:38  Lying BP: --/-- HR: --  Sitting BP: 122/84 HR: 86  Standing BP: --/-- HR: --  Site: --  Mode: --

## 2021-09-13 NOTE — BH INPATIENT PSYCHIATRY DISCHARGE NOTE - OTHER PAST PSYCHIATRIC HISTORY (INCLUDE DETAILS REGARDING ONSET, COURSE OF ILLNESS, INPATIENT/OUTPATIENT TREATMENT)
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 Presented to crisis clinic seeking voluntary admission to [facility]. Clinical presentation today consistent with acute psychosis with severe formal thought disorder and frequently appearing to be responding to internal stimuli. Mood features not prominent today." See above.

## 2021-09-13 NOTE — BH INPATIENT PSYCHIATRY DISCHARGE NOTE - NSDCMRMEDTOKEN_GEN_ALL_CORE_FT
ARIPiprazole 30 mg oral tablet: 1 tab(s) orally once a day (at bedtime)  ARIPiprazole 400 mg intramuscular injection, extended release: 400 milligram(s) intramuscular every 4 weeks last dose given 9/13/21. Next dose on or around 10/13/21

## 2021-09-13 NOTE — BH INPATIENT PSYCHIATRY DISCHARGE NOTE - HPI (INCLUDE ILLNESS QUALITY, SEVERITY, DURATION, TIMING, CONTEXT, MODIFYING FACTORS, ASSOCIATED SIGNS AND SYMPTOMS)
Per Crisis Clinic note, "34 yo M, single, non-caregiver domiciled with mother, currently unemployed, last worked in [employer] 2020. Pt has history of 4 psychiatric hospitalizations, three of the admissions were at [facility]: 05-05, 07-07 and most recently 5/18/15-6/4/15 where he was treated for psychosis. Pt has hx of of diagnoses of Schizophrenia, Schizoaffective D/O and Bipolar I D/O. Pt has hx being tx'd at [facility] ECT 6/12/15-17 as well as PHP 16-7/16/15. Pt was treated at [facility] AOPD from 7/23/15-1/15/21, undersigned reviewed records in [facility] pt was treated for Bipolar I D/O with psychotic features with Abilify maintena 400mg, decided he felt better and did not continue medication after 10/2020. Pt seen at [facility] 4x in  – 3/17/, 3/23, 3/24, . Was prescribed olanzapine 10mg on  by Dr. Torre but states he did not take it. Pt was seen at [facility] ED 21 but declined admission. Pt has Pt denies history of aggressive or violent behavior, no legal conflicts, no access to firearms.  Pt presents to [facility] seeking admission.  PMH: HBP. Meds: none. Allergies: NKDA, shellfish allergy. Pt reports having an “acute” difficulty with reality, difficulty organizing thoughts, states he is here to get back to “fundamentals and nutrition” which is reference to speaking with a psychiatrist and being on medication. Pt denies AVH but appears to be responding to internal stimuli, reports worrying about having a blackout (collateral states blackout refers to first psychotic episode and admission), increased irritability and feeling on edge, feelings of shame due to his condition. Pt’s appetite has decreased, has not eaten in several days. Collateral w/ sister Jonh: reports family has been encouraging to get back on medication, pt has been reluctant up until today. Reports symptoms have been getting worse in the last month, pt is more disoriented, mother had to shower Pt today, increased self-isolation, appears sadder, unable to maintain a conversation without going off topic. Pt recently verbalized “wanting to be in heaven with  grandparents, which he has never done before. Reports noticing scratches on Pt’s neck but unsure if its intentional. Denies recent ETOH or substance use. Inpatient discharge summary from  reviewed: patient had stopped taking oral Abilify 6 months prior to admission, was on 30mg daily and clonazepam 1mg qHS at the time"     Upon arrival to unit, patient continues to be internally preoccupied and is minimally verbal. Patient is poor historian, and often displays a lost train of thought/could not explain his thoughts.  Patient often staring off.  When asked, was not able to say what he was looking at.  Patient describes feelings of "hurting inside", and a "lack of economy" as reasons why he wants help.  Admits to disorganized thoughts.  He denies paranoia or hallucinations.  Patient denies substance use, medical issues. Patient denies SI/HI.

## 2021-09-13 NOTE — BH INPATIENT PSYCHIATRY PROGRESS NOTE - NSBHFUPINTERVALHXFT_PSY_A_CORE
Patient chart reviewed, and case discussed with interdisciplinary care team. Patient seen in day room. No acute events overnight. Patient says that he is doing well, had a good weekend. Patient describes mood as "moderate", has slept well and has good appetite. Patient initiated conversation regarding discharge, said that he would agree with discharge this patient. When asked about JOHNSON Abilify maintenna, patient initially declined, as he had poor experience of back pain when previously given the injectable in the buttocks. Patient was informed of convenience of JOHNSON, and that it can be given in shoulder. Patient later became agreeable to receiving shot upon d/c, but still prefers tablets, and is considering switching back to Abilify tablet after 1 month. Patient denies SI/HI, AVH. Patient chart reviewed, and case discussed with interdisciplinary care team. Patient seen in day room. No acute events overnight. Patient says that he is doing well, had a good weekend. Patient describes mood as "moderate", has slept well and has good appetite. When asked about JOHNSON Abilify maintenna, patient initially declined, as he had poor experience of back pain when previously given the injectable in the buttocks. Patient was informed of convenience of JOHNSON, and that it can be given in shoulder. Patient later became agreeable to receiving shot upon d/c, but still prefers tablets, and is considering switching back to Abilify tablet after 1 month. Patient denies SI/HI, AVH.

## 2021-09-13 NOTE — BH INPATIENT PSYCHIATRY DISCHARGE NOTE - NSBHMETABOLIC_PSY_ALL_CORE_FT
BMI: BMI (kg/m2): 36.6 (09-02-21 @ 15:34)  HbA1c: A1C with Estimated Average Glucose Result: 5.6 % (09-03-21 @ 10:21)    Glucose: POCT Blood Glucose.: 96 mg/dL (08-27-21 @ 21:11)    BP: 120/86 (09-11-21 @ 09:06) (120/86 - 120/86)  Lipid Panel: Date/Time: 09-03-21 @ 10:21  Cholesterol, Serum: 216  Direct LDL: --  HDL Cholesterol, Serum: 40  Total Cholesterol/HDL Ration Measurement: --  Triglycerides, Serum: 172

## 2021-09-13 NOTE — BH INPATIENT PSYCHIATRY PROGRESS NOTE - MSE UNSTRUCTURED FT
Upon assessment today, patient adequately groomed, appears stated age.  Patient has poor eye contact  Speech is of normal volume and normal rate  No psychomotor retardation/agitation  Mood: "moderate"  Affect: euthymic  Thought Process: Oddly related, improved compared to last interview  Thought Content: Poverty of content, more clear than prior days  Perception: Occasionally appears internally preoccupied  Patient is alert and oriented x3  Patient is grossly cognitively not intact, Memory is poor  Insight is fair, judgement is fair  Impulse control intact at this time

## 2021-09-13 NOTE — [facility] INPATIENT PSYCHIATRY DISCHARGE NOTE - HOSPITAL COURSE
Dates of Hospitalization: 9/2/21-9/13/21  Upon admission, patient was disorganized, and seemed to be responding to internal stimuli, but denied AVH, SI/HI. Patient had preservation on "nutrition, fundamentals, and lack of economy", and was unable to explain further details regarding this. Patient was admitted due to being unable to take care of self, neglecting ADLs. These symptoms occurred in the context of med noncompliance (Abilify 30mg PO daily). Patient was started on low dose of Abilify and titrated up to 30mg PO daily during hospital stay. the patient was made aware of the risks and benefits of medication and tolerated it well with no apparent side effects.     On the day of discharge, the patient’s mood and affect are **** normal/euthymic. Patient denies depressed mood and anxiety****. Patient is not hopeless****. Sleep and appetite are also normal (at baseline). Pt’s motor performance and productivity of speech are WNL. Pt denies symptoms of psychosis including AH/VH with no apparent delusions (or is at baseline/not preoccupied with delusions). Patient denies S/H/I/I/P.   There were no behavioral problems on the unit. Patient did not become agitated, did not require emergency intramuscular medications or seclusion/restraints, and did not self-harm on the unit. Patient remained actively engaged in treatment and participated in individual, group, and milieu therapy. Patient got along appropriately with staff and peers.   Medically, during this hospitalization there were no issues.   Suicidal and aggression risk assessments were performed on the day of discharge. The patient is at elevated chronic risk of self-harm due to an underlying ________ disorder. Patient is not actively [suicidal/homicidal/manic/depressive/etc.], making them appropriate for less restrictive treatment as an outpatient without need for continued inpatient hospitalization. Protective factors for suicide and aggression include [INSERT HERE]. Risk factors include [INSERT HERE]. Immediate risk was minimized by inpatient admission to a safe environment with appropriate supervision and limited access to lethal means. Future risk was minimized before discharge by treatment of [TYPE OF SYMPTOMS] symptoms, maximizing outpatient support, providing relevant patient education, discussing emergency procedures, and ensuring close follow-up.   Patient has made clinically meaningful progress during this hospitalization and has clearly benefited from medications and psychotherapy. On day of discharge, the patient has improved significantly and no longer requires inpatient treatment and care. Pt will be discharged and follow-up with outpatient care.   Patient was provided with a 14-day supply of medications, extensive psychoeducation on treatment options and motivational counseling targeting healthy lifestyle. Patient was instructed on actions for crisis situations, understood and agreed to follow instructions for handling crisis, including coming to ER or calling 911 should the patient or their family feel that they are in danger of hurting self or others. Patient also was given Suicide Prevention Lifeline number 1-917.556.7884 and provided with instructions on its use.   Patient will be discharged with the following DSM5 Diagnoses: ****   Patient will be discharged on the following medications: **** Dates of Hospitalization: 9/2/21-9/13/21  Upon admission, patient was disorganized, and seemed to be responding to internal stimuli, but denied AVH, SI/HI. Patient had preservation on "nutrition, fundamentals, and lack of economy", and was unable to explain further details regarding this. Patient was admitted due to being unable to take care of self, neglecting ADLs. These symptoms occurred in the context of med noncompliance (Abilify 30mg PO daily). Patient was started on low dose of Abilify and titrated up to 30mg PO daily during hospital stay. the patient was made aware of the risks and benefits of medication and tolerated it well with no apparent side effects. It was initially very difficult for patient to participate in meaningful conversation due to his disorganization. Throughout hospital course, patient gradually improved in organization and odd thoughts. Patient continues to have preservation on nutrition, which patient's sister believes is a symptom that can takes weeks to months to subside. Both patient and his sister were agreeable to discharge, as patient became increasingly able to participate in thoughtful conversation, and became more organized throughout hospital stay. Patient able to care for ADLs as well.    On the day of discharge, the patient’s mood and affect are normal/euthymic. Patient denies depressed mood and anxiety. Patient is not hopeless. Sleep and appetite are also normal (at baseline). Pt’s motor performance and productivity of speech are WNL. Pt denies symptoms of psychosis including AH/VH with no apparent delusions (or is at baseline/not preoccupied with delusions). Patient denies S/H/I/I/P.   There were no behavioral problems on the unit. Patient did not become agitated, did not require emergency intramuscular medications or seclusion/restraints, and did not self-harm on the unit. Patient remained actively engaged in treatment and participated in individual, group, and milieu therapy. Patient got along appropriately with staff and peers.   Medically, during this hospitalization there were no issues.   Suicidal and aggression risk assessments were performed on the day of discharge. The patient is at elevated chronic risk of self-harm due to an underlying ________ disorder. Patient is not actively [suicidal/homicidal/manic/depressive/etc.], making them appropriate for less restrictive treatment as an outpatient without need for continued inpatient hospitalization. Protective factors for suicide and aggression include [INSERT HERE]. Risk factors include [INSERT HERE]. Immediate risk was minimized by inpatient admission to a safe environment with appropriate supervision and limited access to lethal means. Future risk was minimized before discharge by treatment of [TYPE OF SYMPTOMS] symptoms, maximizing outpatient support, providing relevant patient education, discussing emergency procedures, and ensuring close follow-up.   Patient has made clinically meaningful progress during this hospitalization and has clearly benefited from medications and psychotherapy. On day of discharge, the patient has improved significantly and no longer requires inpatient treatment and care. Pt will be discharged and follow-up with outpatient care.   Patient was provided with a 14-day supply of medications, extensive psychoeducation on treatment options and motivational counseling targeting healthy lifestyle. Patient was instructed on actions for crisis situations, understood and agreed to follow instructions for handling crisis, including coming to ER or calling 911 should the patient or their family feel that they are in danger of hurting self or others. Patient also was given Suicide Prevention Lifeline number 1-224.902.1668 and provided with instructions on its use.   Patient will be discharged with the following DSM5 Diagnoses: ****   Patient will be discharged on the following medications: **** Dates of Hospitalization: 9/2/21-9/13/21  Upon admission, patient was disorganized, and seemed to be responding to internal stimuli, but denied AVH, SI/HI. Patient had preservation on "nutrition, fundamentals, and lack of economy", and was unable to explain further details regarding this. Patient was admitted due to being unable to take care of self, neglecting ADLs. These symptoms occurred in the context of med noncompliance (Abilify 30mg PO daily). Patient was started on low dose of Abilify and titrated up to 30mg PO daily during hospital stay. the patient was made aware of the risks and benefits of medication and tolerated it well with no apparent side effects. It was initially very difficult for patient to participate in meaningful conversation due to his disorganization. Throughout hospital course, patient gradually improved in organization and odd thoughts. Patient continues to have preservation on nutrition, which patient's sister believes is a symptom that can takes weeks to months to subside. Both patient and his sister were agreeable to discharge, as patient became increasingly able to participate in thoughtful conversation, and became more organized throughout hospital stay. Patient able to care for ADLs as well.    On the day of discharge, the patient’s mood and affect are normal/euthymic. Patient denies depressed mood and anxiety. Patient is not hopeless. Sleep and appetite are also normal (at baseline). Pt’s motor performance and productivity of speech are WNL. Pt denies symptoms of psychosis including AH/VH with no apparent delusions. Patient denies S/H/I/I/P.     There were no behavioral problems on the unit. Patient did not become agitated, did not require emergency intramuscular medications or seclusion/restraints, and did not self-harm on the unit. Patient remained actively engaged in treatment and participated in individual, group, and milieu therapy. Patient got along appropriately with staff and peers.     Medically, during this hospitalization there were no issues.     Suicidal and aggression risk assessments were performed on the day of discharge. The patient is at elevated chronic risk of self-harm due to an underlying schizophrenia. Patient is not actively suicidal/homicidal or psychotic, making them appropriate for less restrictive treatment as an outpatient without need for continued inpatient hospitalization. Protective factors for suicide and aggression include help seeking behaviors and social support. Risk factors include medication noncompliance. Immediate risk was minimized by inpatient admission to a safe environment with appropriate supervision and limited access to lethal means. Future risk was minimized before discharge by treatment of disorientation and disorganizatyion symptoms, maximizing outpatient support, providing relevant patient education, discussing emergency procedures, and ensuring close follow-up.     Patient has made clinically meaningful progress during this hospitalization and has clearly benefited from medications and psychotherapy. On day of discharge, the patient has improved significantly and no longer requires inpatient treatment and care. Pt will be discharged and follow-up with outpatient care.   Patient was provided with a 14-day supply of medications, extensive psychoeducation on treatment options and motivational counseling targeting healthy lifestyle. Patient was instructed on actions for crisis situations, understood and agreed to follow instructions for handling crisis, including coming to ER or calling 911 should the patient or their family feel that they are in danger of hurting self or others. Patient also was given Suicide Prevention Lifeline number 3-096-935-5938 and provided with instructions on its use.   Patient will be discharged with the following DSM5 Diagnoses: ****   Patient will be discharged on the following medications: **** Dates of Hospitalization: 9/2/21-9/13/21  Upon admission, patient was disorganized, and seemed to be responding to internal stimuli, but denied AVH, SI/HI. Patient had preservation on "nutrition, fundamentals, and lack of economy", and was unable to explain further details regarding this. Patient was admitted due to being unable to take care of self, neglecting ADLs. These symptoms occurred in the context of med noncompliance (Abilify 30mg PO daily). Patient was started on low dose of Abilify and titrated up to 30mg PO daily during hospital stay. the patient was made aware of the risks and benefits of medication and tolerated it well with no apparent side effects. It was initially very difficult for patient to participate in meaningful conversation due to his disorganization. Throughout hospital course, patient gradually improved in organization and odd thoughts. Patient continues to have preservation on nutrition, which patient's sister believes is a symptom that can takes weeks to months to subside. Both patient and his sister were agreeable to discharge, as patient became increasingly able to participate in thoughtful conversation, and became more organized throughout hospital stay. Patient able to care for ADLs as well.    On the day of discharge, the patient’s mood and affect are normal/euthymic. Patient denies depressed mood and anxiety. Patient is not hopeless. Sleep and appetite are also normal (at baseline). Pt’s motor performance and productivity of speech are WNL. Pt denies symptoms of psychosis including AH/VH with no apparent delusions. Patient denies S/H/I/I/P.     There were no behavioral problems on the unit. Patient did not become agitated, did not require emergency intramuscular medications or seclusion/restraints, and did not self-harm on the unit. Patient remained actively engaged in treatment and participated in individual, group, and milieu therapy. Patient got along appropriately with staff and peers.     Medically, during this hospitalization there were no issues.     Suicidal and aggression risk assessments were performed on the day of discharge. The patient is at elevated chronic risk of self-harm due to an underlying schizophrenia. Patient is not actively suicidal/homicidal or psychotic, making them appropriate for less restrictive treatment as an outpatient without need for continued inpatient hospitalization. Protective factors for suicide and aggression include help seeking behaviors and social support. Risk factors include medication noncompliance. Immediate risk was minimized by inpatient admission to a safe environment with appropriate supervision and limited access to lethal means. Future risk was minimized before discharge by treatment of disorientation and disorganizatyion symptoms, maximizing outpatient support, providing relevant patient education, discussing emergency procedures, and ensuring close follow-up.     Patient has made clinically meaningful progress during this hospitalization and has clearly benefited from medications and psychotherapy. On day of discharge, the patient has improved significantly and no longer requires inpatient treatment and care. Pt will be discharged and follow-up with outpatient care.   Patient was provided with a 14-day supply of medications, extensive psychoeducation on treatment options and motivational counseling targeting healthy lifestyle. Patient was instructed on actions for crisis situations, understood and agreed to follow instructions for handling crisis, including coming to ER or calling 911 should the patient or their family feel that they are in danger of hurting self or others. Patient also was given Suicide Prevention Lifeline number 0-207-539-4970 and provided with instructions on its use.     *** Please refer to attending attestation at end of this document for additional clinical information. Dates of Hospitalization: 9/2/21-9/13/21  Upon admission, patient was disorganized, and seemed to be responding to internal stimuli, but denied AVH, SI/HI. Patient had preservation on "nutrition, fundamentals, and lack of economy", and was unable to explain further details regarding this. Patient was admitted due to being unable to take care of self, neglecting ADLs. These symptoms occurred in the context of med noncompliance (Abilify 30mg PO daily). Patient was started on low dose of Abilify and titrated up to 30mg PO daily during hospital stay. the patient was made aware of the risks and benefits of medication and tolerated it well with no apparent side effects. It was initially very difficult for patient to participate in meaningful conversation due to his disorganization. Throughout hospital course, patient gradually improved in organization and odd thoughts. Patient continues to have preservation on nutrition, which patient's sister believes is a symptom that can takes weeks to months to subside. Both patient and his sister were agreeable to discharge, as patient became increasingly able to participate in thoughtful conversation, and became more organized throughout hospital stay. Patient able to care for ADLs as well.    On the day of discharge, the patient’s mood and affect are normal/euthymic. Patient denies depressed mood and anxiety. Patient is not hopeless. Sleep and appetite are also normal (at baseline). Pt’s motor performance and productivity of speech are WNL. Pt denies symptoms of psychosis including AH/VH with no apparent delusions. Patient denies S/H/I/I/P.     There were no behavioral problems on the unit. Patient did not become agitated, did not require emergency intramuscular medications or seclusion/restraints, and did not self-harm on the unit. Patient remained actively engaged in treatment and participated in individual, group, and milieu therapy. Patient got along appropriately with staff and peers.     Medically, during this hospitalization there were no issues.     Suicidal and aggression risk assessments were performed on the day of discharge. The patient is at elevated chronic risk of self-harm due to an underlying schizophrenia. Patient is not actively suicidal/homicidal or psychotic, making them appropriate for less restrictive treatment as an outpatient without need for continued inpatient hospitalization. Protective factors for suicide and aggression include help seeking behaviors and social support. Risk factors include medication noncompliance. Immediate risk was minimized by inpatient admission to a safe environment with appropriate supervision and limited access to lethal means. Future risk was minimized before discharge by treatment of disorientation and disorganizatyion symptoms, maximizing outpatient support, providing relevant patient education, discussing emergency procedures, and ensuring close follow-up.     Patient has made clinically meaningful progress during this hospitalization and has clearly benefited from medications and psychotherapy. On day of discharge, the patient has improved significantly and no longer requires inpatient treatment and care. Pt will be discharged and follow-up with outpatient care.   Patient was provided with a 14-day supply of medications, extensive psychoeducation on treatment options and motivational counseling targeting healthy lifestyle. Patient was instructed on actions for crisis situations, understood and agreed to follow instructions for handling crisis, including coming to ER or calling 911 should the patient or their family feel that they are in danger of hurting self or others. Patient also was given Suicide Prevention Lifeline number 3-022-256-6971 and provided with instructions on its use.     *** Please refer to attending attestation at end of this document for additional clinical information. Dates of Hospitalization: 9/2/21-9/14/21  Upon admission, patient was disorganized, and seemed to be responding to internal stimuli, but denied AVH, SI/HI. Patient had preservation on "nutrition, fundamentals, and lack of economy", and was unable to explain further details regarding this. Patient was admitted due to being unable to take care of self, neglecting ADLs. These symptoms occurred in the context of med noncompliance (Abilify 30mg PO daily). Patient was started on low dose of Abilify and titrated up to 30mg PO daily during hospital stay. Patient also received Abilify Maintenna injection on 9/13. The patient was made aware of the risks and benefits of medication and tolerated it well with no apparent side effects. It was initially very difficult for patient to participate in meaningful conversation due to his disorganization. Throughout hospital course, patient gradually improved in organization and odd thoughts. Patient continues to have preservation on nutrition, which patient's sister believes is a symptom that can takes weeks to months to subside. Both patient and his sister were agreeable to discharge, as patient became increasingly able to participate in thoughtful conversation, and became more organized throughout hospital stay. Patient able to care for ADLs as well.    On the day of discharge, the patient’s mood and affect are normal/euthymic. Patient denies depressed mood and anxiety. Patient is not hopeless. Sleep and appetite are also normal (at baseline). Pt’s motor performance and productivity of speech are WNL. Pt denies symptoms of psychosis including AH/VH with no apparent delusions. Patient denies S/H/I/I/P.     There were no behavioral problems on the unit. Patient did not become agitated, did not require emergency intramuscular medications or seclusion/restraints, and did not self-harm on the unit. Patient remained actively engaged in treatment and participated in individual, group, and milieu therapy. Patient got along appropriately with staff and peers.     Medically, during this hospitalization there were no issues.     Suicidal and aggression risk assessments were performed on the day of discharge. The patient is at elevated chronic risk of self-harm due to an underlying schizoaffective disorder. Patient is not actively suicidal/homicidal or psychotic, making them appropriate for less restrictive treatment as an outpatient without need for continued inpatient hospitalization. Protective factors for suicide and aggression include help seeking behaviors and social support. Risk factors include medication noncompliance. Immediate risk was minimized by inpatient admission to a safe environment with appropriate supervision and limited access to lethal means. Future risk was minimized before discharge by treatment of disorientation and disorganizatyion symptoms, maximizing outpatient support, providing relevant patient education, discussing emergency procedures, and ensuring close follow-up.     Patient has made clinically meaningful progress during this hospitalization and has clearly benefited from medications and psychotherapy. On day of discharge, the patient has improved significantly and no longer requires inpatient treatment and care. Pt will be discharged and follow-up with outpatient care.     Patient was provided with a 14-day supply of Abilify 30mg PO qhs, with instructions to receive 1 monthly Abilify maintenna injection, extensive psychoeducation on treatment options and motivational counseling targeting healthy lifestyle. Patient was instructed on actions for crisis situations, understood and agreed to follow instructions for handling crisis, including coming to ER or calling 911 should the patient or their family feel that they are in danger of hurting self or others. Patient also was given Suicide Prevention Lifeline number 1-914.796.4330 and provided with instructions on its use.         *** Please refer to attending attestation at end of this document for additional clinical information.

## 2021-09-13 NOTE — BH INPATIENT PSYCHIATRY PROGRESS NOTE - NSBHASSESSSUMMFT_PSY_ALL_CORE
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 Presented to crisis clinic seeking voluntary admission to [facility]. Clinical presentation today consistent with acute psychosis with severe formal thought disorder and frequently appearing to be responding to internal stimuli. Mood features not prominent today. Upon initial assessment in unit, patient continues to be disorganized, oddly related, continues to exhibit thought blocking and internal preoccupation.    Upon assessment today, patient continues to be oddly related, has made some improvements in his ability to participate in thoughtful interview, possibly less internally preoccupied. Patient agreeable to JOHNSON, still prefers tablets. Patient agreeable to d/c within next few days.    Plan:  C/w Abilify 30mg PO qhs  Plan for Abilify Maintena JOHNSON closer to d/c

## 2021-09-13 NOTE — BH INPATIENT PSYCHIATRY DISCHARGE NOTE - NSICDXPASTMEDICALHX_GEN_ALL_CORE_FT
PAST MEDICAL HISTORY:  Bipolar disorder     Eczema     Joint symptoms     Seizure X1 2006     PAST MEDICAL HISTORY:  Eczema     Joint symptoms     Schizoaffective disorder     Seizure X1 2006

## 2021-09-13 NOTE — BH INPATIENT PSYCHIATRY DISCHARGE NOTE - CASE SUMMARY
Pt is a 35 year old man, with pphx of schizoaffective disorder, admitted with psychotic decompensation in context of med noncompliance.  Pt became more linear in thought process and behavior with restart of aripiprazole, titrated to 30 mg daily.  Pt still remained oddly related, with peculiar thoughts at times, however as per documentation review, this appeared to be part of pt's baseline.  Pt was calm and in behavioral control throughout admission, compliant with meds and basic care, consistently denied suicidality and homicidality, and kept to self.  He accepted Abilify Maintena 400 mg IM on 9/13/2021.  Family was in agreement with discharge, pt returned home with outpatient follow up appointment at [facility].    Risk assessment on discharge: Pt has chronic risk factors of male gender, pphx of schizoaffective disorder,hx of noncompliance, hx of substance use in remote past, chronic medical problems, with acute risk factors of recent psychosis now treated with inpatient care.  Protective factors of stable housing, supportive family, future orientation, on JOHNSON.  Pt is moderate to high CHRONIC risk of harm, but is NO longer an acute or imminent risk of harm to self or others, can be discharged home with outpatient follow up.

## 2021-09-14 VITALS — DIASTOLIC BLOOD PRESSURE: 75 MMHG | HEART RATE: 82 BPM | TEMPERATURE: 97 F | SYSTOLIC BLOOD PRESSURE: 112 MMHG

## 2021-09-14 PROCEDURE — 99238 HOSP IP/OBS DSCHRG MGMT 30/<: CPT

## 2021-09-14 NOTE — BH INPATIENT PSYCHIATRY PROGRESS NOTE - NSBHINPTBILLING_PSY_ALL_CORE
84165 - Inpatient Low Complexity
07206 - Inpatient Low Complexity
86511 - Inpatient Low Complexity
11608 - Inpatient Moderate Complexity
98188 - Inpatient Low Complexity
00379 - Inpatient Moderate Complexity
19238 - Inpatient Moderate Complexity
37316 - Inpatient Moderate Complexity
42720 - Inpatient Moderate Complexity
46344 - Hospital Discharge Day Management; 30 min or less

## 2021-09-14 NOTE — BH INPATIENT PSYCHIATRY PROGRESS NOTE - NSBHCHARTREVIEWVS_PSY_A_CORE FT
Vital Signs Last 24 Hrs  T(C): 36.2 (09-14-21 @ 08:28), Max: 36.9 (09-13-21 @ 16:25)  T(F): 97.2 (09-14-21 @ 08:28), Max: 98.4 (09-13-21 @ 16:25)  HR: 82 (09-14-21 @ 08:28) (82 - 82)  BP: 112/75 (09-14-21 @ 08:28) (112/75 - 112/75)  BP(mean): --  RR: --  SpO2: --    Orthostatic VS  09-13-21 @ 07:38  Lying BP: --/-- HR: --  Sitting BP: 122/84 HR: 86  Standing BP: --/-- HR: --  Site: --  Mode: --

## 2021-09-14 NOTE — BH INPATIENT PSYCHIATRY PROGRESS NOTE - NSTXDCOPLKDATEEST_PSY_ALL_CORE
03-Sep-2021

## 2021-09-14 NOTE — BH DISCHARGE NOTE NURSING/SOCIAL WORK/PSYCH REHAB - NSDCPRGOAL_PSY_ALL_CORE
During the current hospitalization, patient has been working on psychiatric rehabilitation goals pertaining to taking all medications as prescribed. Patient has made good progress. Pt has reported medication compliance. Patient is currently assessed with a linear thought process with fair insight and judgment. Patient has been observed behaviorally appropriate in the environment. Patient is receptive to staff engagement. Patient reported his mood is "okay" and he was assessed with a slightly constricted affect. Patient is observed with good ADLs and is observed wearing street clothes. Patient maintained fair impulse control throughout hospitalization. Patient denied suicidal and homicidal ideation/intent/plan. Pt denied auditory and visual hallucinations. Patient reports plans to go to his virtual outpatient treatment sessions and to relax in his backyard post discharge.     During the current hospitalization, patient has been working on psychiatric rehabilitation goals pertaining to taking all medications as prescribed and identifying the benefits of medication and/or treatment compliance. Patient has made good progress. Pt has reported medication compliance and identified a benefit of compliance as that he feels more "Calm" when on medications. Patient is currently assessed with a linear thought process with fair insight and judgment. Patient has been observed behaviorally appropriate in the environment. Patient is receptive to staff engagement. Patient reported his mood is "okay" and he was assessed with a slightly constricted affect. Patient is observed with good ADLs and is observed wearing street clothes. Patient maintained fair impulse control throughout hospitalization. Patient denied suicidal and homicidal ideation/intent/plan. Pt denied auditory and visual hallucinations. Patient reports plans to go to his virtual outpatient treatment sessions and to relax in his backyard post discharge.

## 2021-09-14 NOTE — BH INPATIENT PSYCHIATRY PROGRESS NOTE - NSTXPSYCHOINTERMD_PSY_ALL_CORE
Abilify trial

## 2021-09-14 NOTE — BH INPATIENT PSYCHIATRY PROGRESS NOTE - NSTXPSYCHOGOAL_PSY_ALL_CORE
Will identify 2 coping skills that assist with focus on reality

## 2021-09-14 NOTE — BH INPATIENT PSYCHIATRY PROGRESS NOTE - NSTXMEDICINTERMD_PSY_ALL_CORE
Encourage medication adherence, psychiatry appointment follow up

## 2021-09-14 NOTE — BH INPATIENT PSYCHIATRY PROGRESS NOTE - MODIFICATIONS
Patient seen by me, chart reviewed, and case discussed with treatment team.  Reviewed and agree with above progress note, assessment and plan; corrections and modification made where appropriate.
Modifications were made to above trainee note where appropriate and/or are addressed below in case summary. 

## 2021-09-14 NOTE — BH INPATIENT PSYCHIATRY PROGRESS NOTE - NSTXDISORGGOAL_PSY_ALL_CORE
Will identify 2 coping skills that assist in organizing

## 2021-09-14 NOTE — BH DISCHARGE NOTE NURSING/SOCIAL WORK/PSYCH REHAB - DISCHARGE INSTRUCTIONS AFTERCARE APPOINTMENTS
In order to check the location, date, or time of your aftercare appointment, please refer to your Discharge Instructions Document given to you upon leaving the hospital.  If you have lost the instructions please call [phone]

## 2021-09-14 NOTE — BH INPATIENT PSYCHIATRY PROGRESS NOTE - NSBHFUPINTERVALHXFT_PSY_A_CORE
Patient chart reviewed, and case discussed with interdisciplinary care team. Patient seen in day room. No acute events overnight. Patient says that he is doing well, tolerated JOHNSON yesterday without side effects. Patient describes mood as "moderate", has slept well and has good appetite. Patient agreeable to discharge today. Safety planning was performed with patient. Patient denies SI/HI, AVH.

## 2021-09-14 NOTE — BH INPATIENT PSYCHIATRY PROGRESS NOTE - NSTXPSYCHOPROGRES_PSY_ALL_CORE
No Change
Improving
No Change
No Change
Met - goal discontinued
Improving
No Change

## 2021-09-14 NOTE — BH INPATIENT PSYCHIATRY PROGRESS NOTE - PRN MEDS
MEDICATIONS  (PRN):  diphenhydrAMINE 50 milliGRAM(s) Oral every 6 hours PRN EPS  diphenhydrAMINE   Injectable 50 milliGRAM(s) IntraMuscular once PRN EPS  haloperidol     Tablet 5 milliGRAM(s) Oral every 6 hours PRN agitation  haloperidol    Injectable 5 milliGRAM(s) IntraMuscular once PRN combative behavior  LORazepam     Tablet 2 milliGRAM(s) Oral every 6 hours PRN anxiety  LORazepam   Injectable 2 milliGRAM(s) IntraMuscular once PRN combative behavior  melatonin. 3 milliGRAM(s) Oral at bedtime PRN Insomnia  

## 2021-09-14 NOTE — BH DISCHARGE NOTE NURSING/SOCIAL WORK/PSYCH REHAB - NSDCPRRECOMMEND_PSY_ALL_CORE
Patient was provided with and completed a Patient Safety Template with psych rehab staff prior to discharge. Patient refused the [employer] survey.     Psychiatric rehabilitation staff recommends that patient return to outpatient treatment for ongoing medication management, support and psychotherapy.

## 2021-09-14 NOTE — BH INPATIENT PSYCHIATRY PROGRESS NOTE - CASE SUMMARY
The patient remains psychotic, disorganized, internally preoccupied.  He admits to some depression, but denies any SI/HI.  Tolerated abilify well, will increase to 10mg.  Plan for JOHNSON.
Pt continues to have some disorganization, oddly related, but slowly improving, visible on unit.    Continue with aripiprazole titration.
Pt continues to be internally preoccupied and disorganized.  Continue titration of aripiprazole.  Consider Maintena.
Pt continues to be oddly related, concrete and vague with thought process.  Continue aripiprazole retrial.
Pt exhibiting plateau in improvement, will continue with aripiprazole to max 30 mg daily and monitor over weekend.
Pt improved, more linear in TP, future oriented, denies SIIP or HIIP.    Risk assessment on discharge: Pt has chronic risk factors of male gender, pphx of schizoaffective disorder,hx of noncompliance, hx of substance use in remote past, chronic medical problems, with acute risk factors of recent psychosis now treated with inpatient care.  Protective factors of stable housing, supportive family, future orientation, on JOHNSON.  Pt is moderate to high CHRONIC risk of harm, but is NO longer an acute or imminent risk of harm to self or others, can be discharged home with outpatient follow up.    1. Will d/c home on current regimen.  Pt informed he will need to take oral aripiprazole for 2 week overlap with injection and then can stop tabs as long as he continues with JOHNSON.  Pt acknowledged understanding.  2. Psychoeducation provided regarding importance of compliance with outpatient appointments and medications.  3. Pt was advised to remain abstinent with substances.  4. Pt was advised to dial 911 or return to ER if they become danger to self or others. 
Pt with improvement in symptoms.  Continue meds.  Amenable to JOHNSON.  Dispo planning.

## 2021-09-14 NOTE — BH INPATIENT PSYCHIATRY PROGRESS NOTE - MSE UNSTRUCTURED FT
Upon assessment today, patient adequately groomed, appears stated age.  Patient has poor eye contact  Speech is of normal volume and normal rate  No psychomotor retardation/agitation  Mood: "moderate"  Affect: euthymic  Thought Process: somewhat oddly related, improved compared to initial interview  Thought Content: no delusions  Perception: No AVH  Patient is alert and oriented x3  Patient is grossly cognitively not intact, Memory is poor  Insight is fair, judgement is fair  Impulse control intact at this time     Upon assessment today, patient adequately groomed, appears stated age.  Patient has poor eye contact  Speech is of normal volume and normal rate  No psychomotor retardation/agitation  Mood: "moderate"  Affect: euthymic  Thought Process: somewhat oddly related, improved compared to initial interview  Thought Content: no delusions.  no SIIP or HIIP.  Perception: No AVH,   Patient is alert and oriented x3  Patient is grossly cognitively not intact, Memory is poor  Insight is fair, judgement is fair  Impulse control intact at this time

## 2021-09-14 NOTE — BH INPATIENT PSYCHIATRY PROGRESS NOTE - NSBHCHARTREVIEWLAB_PSY_A_CORE FT
16.2   10.70 )-----------( 373      ( 03 Sep 2021 10:21 )             45.7       09-03    139  |  99  |  14  ----------------------------<  120<H>  3.6   |  23  |  0.67    Ca    10.1      03 Sep 2021 10:21    TPro  8.3  /  Alb  5.0  /  TBili  0.6  /  DBili  x   /  AST  20  /  ALT  36  /  AlkPhos  96  09-03                      Lactate Trend            CAPILLARY BLOOD GLUCOSE            

## 2021-09-14 NOTE — BH INPATIENT PSYCHIATRY PROGRESS NOTE - NSBHFUPINTERVALCCFT_PSY_A_CORE
psychosis
psychosis
Patient seen for follow up of psychosis
psychosis
Patient seen for follow up of psychosis

## 2021-09-14 NOTE — BH INPATIENT PSYCHIATRY PROGRESS NOTE - NSBHASSESSSUMMFT_PSY_ALL_CORE
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 Presented to crisis clinic seeking voluntary admission to [facility]. Clinical presentation today consistent with acute psychosis with severe formal thought disorder and frequently appearing to be responding to internal stimuli. Mood features not prominent today. Upon initial assessment in unit, patient continues to be disorganized, oddly related, continues to exhibit thought blocking and internal preoccupation.    Upon assessment today, patient continues to be oddly related, has made improvement in his ability to participate in thoughtful interview, less internally preoccupied. Patient agreeable to JOHNSON, still prefers tablets. Patient agreeable to d/c today.    Plan:  C/w Abilify 30mg PO qhs  Abilify Maintena JOHNSON monthly

## 2021-09-14 NOTE — BH INPATIENT PSYCHIATRY PROGRESS NOTE - NSTXPSYCHODATEEST_PSY_ALL_CORE
02-Sep-2021

## 2021-09-14 NOTE — BH DISCHARGE NOTE NURSING/SOCIAL WORK/PSYCH REHAB - PATIENT PORTAL LINK FT
You can access the FollowMyHealth Patient Portal offered by [facility] by registering at the following website: [email or web address]. By joining [employer]’s FollowMyHealth portal, you will also be able to view your health information using other applications (apps) compatible with our system.

## 2021-09-14 NOTE — BH INPATIENT PSYCHIATRY PROGRESS NOTE - NSTXDCOPLKPROGRES_PSY_ALL_CORE
Improving
Improving
Met - goal discontinued
No Change

## 2021-09-14 NOTE — BH INPATIENT PSYCHIATRY PROGRESS NOTE - NSTXPROBDISORG_PSY_ALL_CORE
DISORGANIZATION OF THOUGHT/BEHAVIOR

## 2021-09-14 NOTE — BH INPATIENT PSYCHIATRY PROGRESS NOTE - NSTXMEDICDATETRGT_PSY_ALL_CORE
10-Sep-2021
17-Sep-2021
10-Sep-2021
10-Sep-2021
14-Sep-2021
10-Sep-2021
17-Sep-2021
10-Sep-2021

## 2021-09-14 NOTE — BH INPATIENT PSYCHIATRY PROGRESS NOTE - NSBHMETABOLIC_PSY_ALL_CORE_FT
BMI: BMI (kg/m2): 36.6 (09-02-21 @ 15:34)  HbA1c: A1C with Estimated Average Glucose Result: 5.6 % (09-03-21 @ 10:21)    Glucose: POCT Blood Glucose.: 96 mg/dL (08-27-21 @ 21:11)    BP: 125/87 (09-05-21 @ 08:28) (120/78 - 125/87)  Lipid Panel: Date/Time: 09-03-21 @ 10:21  Cholesterol, Serum: 216  Direct LDL: --  HDL Cholesterol, Serum: 40  Total Cholesterol/HDL Ration Measurement: --  Triglycerides, Serum: 172  
BMI: BMI (kg/m2): 36.6 (09-02-21 @ 15:34)  HbA1c: A1C with Estimated Average Glucose Result: 5.6 % (09-03-21 @ 10:21)    Glucose: POCT Blood Glucose.: 96 mg/dL (08-27-21 @ 21:11)    BP: 112/75 (09-14-21 @ 08:28) (112/75 - 112/75)  Lipid Panel: Date/Time: 09-03-21 @ 10:21  Cholesterol, Serum: 216  Direct LDL: --  HDL Cholesterol, Serum: 40  Total Cholesterol/HDL Ration Measurement: --  Triglycerides, Serum: 172  
BMI: BMI (kg/m2): 36.6 (09-02-21 @ 15:34)  HbA1c: A1C with Estimated Average Glucose Result: 5.6 % (09-03-21 @ 10:21)    Glucose: POCT Blood Glucose.: 96 mg/dL (08-27-21 @ 21:11)    BP: --  Lipid Panel: Date/Time: 09-03-21 @ 10:21  Cholesterol, Serum: 216  Direct LDL: --  HDL Cholesterol, Serum: 40  Total Cholesterol/HDL Ration Measurement: --  Triglycerides, Serum: 172  
BMI: BMI (kg/m2): 36.6 (09-02-21 @ 15:34)  HbA1c: A1C with Estimated Average Glucose Result: 5.6 % (09-03-21 @ 10:21)    Glucose: POCT Blood Glucose.: 96 mg/dL (08-27-21 @ 21:11)    BP: 131/86 (09-07-21 @ 08:02) (125/87 - 136/94)  Lipid Panel: Date/Time: 09-03-21 @ 10:21  Cholesterol, Serum: 216  Direct LDL: --  HDL Cholesterol, Serum: 40  Total Cholesterol/HDL Ration Measurement: --  Triglycerides, Serum: 172  
BMI: BMI (kg/m2): 36.6 (09-02-21 @ 15:34)  HbA1c: A1C with Estimated Average Glucose Result: 5.6 % (09-03-21 @ 10:21)    Glucose: POCT Blood Glucose.: 96 mg/dL (08-27-21 @ 21:11)    BP: 123/83 (09-08-21 @ 07:53) (123/83 - 131/86)  Lipid Panel: Date/Time: 09-03-21 @ 10:21  Cholesterol, Serum: 216  Direct LDL: --  HDL Cholesterol, Serum: 40  Total Cholesterol/HDL Ration Measurement: --  Triglycerides, Serum: 172  
BMI: BMI (kg/m2): 36.6 (09-02-21 @ 15:34)  HbA1c: A1C with Estimated Average Glucose Result: 5.6 % (09-03-21 @ 10:21)    Glucose: POCT Blood Glucose.: 96 mg/dL (08-27-21 @ 21:11)    BP: 136/94 (09-06-21 @ 08:26) (120/78 - 136/94)  Lipid Panel: Date/Time: 09-03-21 @ 10:21  Cholesterol, Serum: 216  Direct LDL: --  HDL Cholesterol, Serum: 40  Total Cholesterol/HDL Ration Measurement: --  Triglycerides, Serum: 172  
BMI: BMI (kg/m2): 36.6 (09-02-21 @ 15:34)  HbA1c: A1C with Estimated Average Glucose Result: 5.6 % (09-03-21 @ 10:21)    Glucose: POCT Blood Glucose.: 96 mg/dL (08-27-21 @ 21:11)    BP: 137/92 (09-10-21 @ 08:41) (123/83 - 137/92)  Lipid Panel: Date/Time: 09-03-21 @ 10:21  Cholesterol, Serum: 216  Direct LDL: --  HDL Cholesterol, Serum: 40  Total Cholesterol/HDL Ration Measurement: --  Triglycerides, Serum: 172  
BMI: BMI (kg/m2): 36.6 (09-02-21 @ 15:34)  HbA1c: A1C with Estimated Average Glucose Result: 5.6 % (09-03-21 @ 10:21)    Glucose: POCT Blood Glucose.: 96 mg/dL (08-27-21 @ 21:11)    BP: 120/78 (09-04-21 @ 08:19) (120/78 - 120/78)  Lipid Panel: Date/Time: 09-03-21 @ 10:21  Cholesterol, Serum: 216  Direct LDL: --  HDL Cholesterol, Serum: 40  Total Cholesterol/HDL Ration Measurement: --  Triglycerides, Serum: 172  
BMI: BMI (kg/m2): 36.6 (09-02-21 @ 15:34)  HbA1c: A1C with Estimated Average Glucose Result: 5.6 % (09-03-21 @ 10:21)    Glucose: POCT Blood Glucose.: 96 mg/dL (08-27-21 @ 21:11)    BP: 123/83 (09-08-21 @ 07:53) (123/83 - 136/94)  Lipid Panel: Date/Time: 09-03-21 @ 10:21  Cholesterol, Serum: 216  Direct LDL: --  HDL Cholesterol, Serum: 40  Total Cholesterol/HDL Ration Measurement: --  Triglycerides, Serum: 172  
BMI: BMI (kg/m2): 36.6 (09-02-21 @ 15:34)  HbA1c: A1C with Estimated Average Glucose Result: 5.6 % (09-03-21 @ 10:21)    Glucose: POCT Blood Glucose.: 96 mg/dL (08-27-21 @ 21:11)    BP: 120/86 (09-11-21 @ 09:06) (120/86 - 120/86)  Lipid Panel: Date/Time: 09-03-21 @ 10:21  Cholesterol, Serum: 216  Direct LDL: --  HDL Cholesterol, Serum: 40  Total Cholesterol/HDL Ration Measurement: --  Triglycerides, Serum: 172

## 2021-09-14 NOTE — BH INPATIENT PSYCHIATRY PROGRESS NOTE - NSTXPSYCHODATETRGT_PSY_ALL_CORE
09-Sep-2021
17-Sep-2021
09-Sep-2021
17-Sep-2021
17-Sep-2021

## 2021-09-14 NOTE — BH DISCHARGE NOTE NURSING/SOCIAL WORK/PSYCH REHAB - NSCDUDCCRISIS_PSY_A_CORE
[facility] Well  1 (123) [facility]-WELL ([phone])  Text "WELL" to [phone]  Website: www.[employer] 1 (544) 691-OITN (2722) Website: www.safehorizon.org/.National Suicide Prevention Lifeline 1 (883) 541-6099/.  [employer]  1 (627) LIFENET (300-3470)/.  [facility]  [address] [place] [address] [phone]   Hours:  Monday through Friday from 9 AM to 3 PM/.  [employer] - [facility] Crisis Line  7 [phone], Option 1

## 2021-09-14 NOTE — BH INPATIENT PSYCHIATRY PROGRESS NOTE - MSE OPTIONS
Unstructured MSE

## 2021-09-14 NOTE — BH INPATIENT PSYCHIATRY PROGRESS NOTE - NSTXMEDICPROGRES_PSY_ALL_CORE
No Change
No Change
Improving
Met - goal discontinued
No Change

## 2021-09-14 NOTE — BH INPATIENT PSYCHIATRY PROGRESS NOTE - NSTXMEDICGOAL_PSY_ALL_CORE
Take all medications as prescribed
Be able to describe the benefit of medication/treatment
Take all medications as prescribed
Be able to describe the benefit of medication/treatment
Take all medications as prescribed

## 2021-09-14 NOTE — BH INPATIENT PSYCHIATRY PROGRESS NOTE - NSDCCRITERIA_PSY_ALL_CORE
Improvement in disorganization, psychotic symptoms

## 2021-09-14 NOTE — BH INPATIENT PSYCHIATRY PROGRESS NOTE - NSTXDISORGDATEEST_PSY_ALL_CORE
02-Sep-2021

## 2021-09-14 NOTE — BH INPATIENT PSYCHIATRY PROGRESS NOTE - NSTXDCOPLKDATETRGT_PSY_ALL_CORE
17-Sep-2021
10-Sep-2021
10-Sep-2021
14-Sep-2021
10-Sep-2021
10-Sep-2021
17-Sep-2021
10-Sep-2021
10-Sep-2021

## 2021-09-14 NOTE — BH INPATIENT PSYCHIATRY PROGRESS NOTE - NSTXMEDICDATEEST_PSY_ALL_CORE
03-Sep-2021

## 2021-09-18 NOTE — BH TREATMENT PLAN - NSTXPLANTYPE_PSY_ALL_CORE
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 Start dexamethasone 6 mg x 10 Days  No need for prednisone on discharge  Remdesivir completed  ? AC full strength enoxaparin, no need for [facility] on discharge  · Daily CBC and CMP - stable  · CRP downtrended appropriately  · Encourage ISP/flutter valve  Encourage proning and early mobilization  · Blood cultures no growth to date  · Home O2 has been delivered and ready for discharge to home    · Pulm cleared for discharge since stable O2 requirements  · Trelegy home inhaler & Albuterol neb t i d 
Initial
Ongoing

## 2022-02-25 PROBLEM — F25.9 SCHIZOAFFECTIVE DISORDER, UNSPECIFIED: Chronic | Status: ACTIVE | Noted: 2021-09-13

## 2022-02-26 ENCOUNTER — NON-APPOINTMENT (OUTPATIENT)
Age: 37
End: 2022-02-26

## 2022-02-28 ENCOUNTER — APPOINTMENT (OUTPATIENT)
Dept: UROLOGY | Facility: CLINIC | Age: 37
End: 2022-02-28

## 2022-03-04 NOTE — BH SOCIAL WORK INITIAL PSYCHOSOCIAL EVALUATION - NSBHHOUSE_PSY_ALL_CORE
1114      Julianne Magallanes, PCT  03/04/22 1213       Julianne Magallanes, PCT  03/04/22 1213     Home alone

## 2022-07-19 ENCOUNTER — APPOINTMENT (OUTPATIENT)
Dept: OTOLARYNGOLOGY | Facility: CLINIC | Age: 37
End: 2022-07-19

## 2023-08-24 NOTE — ED ADULT NURSE NOTE - CINV DISCH TEACH PARTICIP
Patient Olumiant Pregnancy And Lactation Text: Based on animal studies, Olumiant may cause embryo-fetal harm when administered to pregnant women.  The medication should not be used in pregnancy.  Breastfeeding is not recommended during treatment.

## 2023-09-30 NOTE — ED PROVIDER NOTE - WR ORDER STATUS 1
Resulted
Communicate fall risk and risk factors to all staff, patient, and family/Provide visual cue: yellow wristband, yellow gown, etc/Reinforce activity limits and safety measures with patient and family/Call bell, personal items and telephone in reach/Instruct patient to call for assistance before getting out of bed/chair/stretcher/Non-slip footwear applied when patient is off stretcher/[place] to call system/Physically safe environment - no spills, clutter or unnecessary equipment/Purposeful Proactive Rounding/Room/bathroom lighting operational, light cord in reach

## 2024-02-08 ENCOUNTER — APPOINTMENT (OUTPATIENT)
Dept: HEPATOLOGY | Facility: CLINIC | Age: 39
End: 2024-02-08
Payer: MEDICARE

## 2024-02-08 DIAGNOSIS — K76.0 FATTY (CHANGE OF) LIVER, NOT ELSEWHERE CLASSIFIED: ICD-10-CM

## 2024-02-08 PROCEDURE — 76981 USE PARENCHYMA: CPT

## 2024-02-12 PROBLEM — K76.0 FATTY (CHANGE OF) LIVER, NOT ELSEWHERE CLASSIFIED: Status: ACTIVE | Noted: 2024-02-12

## 2024-05-13 NOTE — BH SOCIAL WORK INITIAL PSYCHOSOCIAL EVALUATION - NSBHSPIRITUAL_PSY_ALL_CORE
Spoke to Dr. Thao, if cardiac clearance is not received from cardiologist by 4:30 we will have to cancel surgery for tomorrow. Edwina with PAT notified.     Spoke to patient informing him surgery tomorrow may need to be cancelled due to cardiologist not giving cardiac clearance. If we do not receive cardiac clearance by 4:30pm today then surgery for tomorrow will be cancelled. Patient will attempt to get in contact with his cardiologist. He was given our office phone and fax number if the office is able to fax over clearance. Patient verbalized understanding of surgery potentially being cancelled and will call the office around 4:30pm to update the team.    [employer] - [employer]/Yes

## 2025-01-15 NOTE — BH PATIENT PROFILE - FUNCTIONAL SCREEN CURRENT LEVEL: SWALLOWING (IF SCORE 2 OR MORE FOR ANY ITEM, CONSULT REHAB SERVICES), MLM)
0 = swallows foods/liquids without difficulty soft/nontender/nondistended/normal active bowel sounds details…